# Patient Record
Sex: FEMALE | Race: WHITE | NOT HISPANIC OR LATINO
[De-identification: names, ages, dates, MRNs, and addresses within clinical notes are randomized per-mention and may not be internally consistent; named-entity substitution may affect disease eponyms.]

---

## 2017-01-24 ENCOUNTER — NON-APPOINTMENT (OUTPATIENT)
Age: 52
End: 2017-01-24

## 2017-01-24 ENCOUNTER — APPOINTMENT (OUTPATIENT)
Dept: CARDIOLOGY | Facility: CLINIC | Age: 52
End: 2017-01-24

## 2017-01-24 VITALS
BODY MASS INDEX: 18.66 KG/M2 | OXYGEN SATURATION: 99 % | HEIGHT: 65 IN | SYSTOLIC BLOOD PRESSURE: 137 MMHG | DIASTOLIC BLOOD PRESSURE: 82 MMHG | HEART RATE: 80 BPM | WEIGHT: 112 LBS

## 2017-01-24 VITALS — SYSTOLIC BLOOD PRESSURE: 110 MMHG | DIASTOLIC BLOOD PRESSURE: 60 MMHG

## 2017-01-24 DIAGNOSIS — K21.9 GASTRO-ESOPHAGEAL REFLUX DISEASE W/OUT ESOPHAGITIS: ICD-10-CM

## 2017-01-24 DIAGNOSIS — R07.89 OTHER CHEST PAIN: ICD-10-CM

## 2017-01-24 DIAGNOSIS — J45.909 UNSPECIFIED ASTHMA, UNCOMPLICATED: ICD-10-CM

## 2017-01-24 DIAGNOSIS — E55.9 VITAMIN D DEFICIENCY, UNSPECIFIED: ICD-10-CM

## 2017-01-25 ENCOUNTER — APPOINTMENT (OUTPATIENT)
Dept: COLORECTAL SURGERY | Facility: CLINIC | Age: 52
End: 2017-01-25

## 2017-01-25 ENCOUNTER — TRANSCRIPTION ENCOUNTER (OUTPATIENT)
Age: 52
End: 2017-01-25

## 2017-01-25 VITALS
HEART RATE: 77 BPM | RESPIRATION RATE: 14 BRPM | BODY MASS INDEX: 18.66 KG/M2 | DIASTOLIC BLOOD PRESSURE: 67 MMHG | SYSTOLIC BLOOD PRESSURE: 108 MMHG | HEIGHT: 65 IN | TEMPERATURE: 98.5 F | WEIGHT: 112 LBS

## 2017-01-25 DIAGNOSIS — K64.5 PERIANAL VENOUS THROMBOSIS: ICD-10-CM

## 2017-01-31 ENCOUNTER — APPOINTMENT (OUTPATIENT)
Dept: CARDIOLOGY | Facility: CLINIC | Age: 52
End: 2017-01-31

## 2017-02-10 ENCOUNTER — APPOINTMENT (OUTPATIENT)
Dept: CARDIOLOGY | Facility: CLINIC | Age: 52
End: 2017-02-10

## 2017-02-24 ENCOUNTER — APPOINTMENT (OUTPATIENT)
Dept: GASTROENTEROLOGY | Facility: CLINIC | Age: 52
End: 2017-02-24

## 2017-02-27 ENCOUNTER — RESULT REVIEW (OUTPATIENT)
Age: 52
End: 2017-02-27

## 2017-06-01 ENCOUNTER — APPOINTMENT (OUTPATIENT)
Dept: SURGICAL ONCOLOGY | Facility: CLINIC | Age: 52
End: 2017-06-01

## 2017-06-01 VITALS
HEIGHT: 65 IN | HEART RATE: 77 BPM | BODY MASS INDEX: 18.33 KG/M2 | DIASTOLIC BLOOD PRESSURE: 74 MMHG | WEIGHT: 110 LBS | SYSTOLIC BLOOD PRESSURE: 111 MMHG | RESPIRATION RATE: 15 BRPM

## 2017-06-01 DIAGNOSIS — N60.01 SOLITARY CYST OF RIGHT BREAST: ICD-10-CM

## 2017-06-01 RX ORDER — PANTOPRAZOLE 40 MG/1
40 TABLET, DELAYED RELEASE ORAL
Qty: 90 | Refills: 0 | Status: DISCONTINUED | COMMUNITY
Start: 2017-02-28 | End: 2017-06-01

## 2017-06-01 RX ORDER — ALBUTEROL 90 MCG
90 AEROSOL (GRAM) INHALATION
Refills: 0 | Status: DISCONTINUED | COMMUNITY
End: 2017-06-01

## 2017-11-13 ENCOUNTER — APPOINTMENT (OUTPATIENT)
Dept: SURGICAL ONCOLOGY | Facility: CLINIC | Age: 52
End: 2017-11-13
Payer: COMMERCIAL

## 2017-11-13 VITALS
OXYGEN SATURATION: 100 % | SYSTOLIC BLOOD PRESSURE: 126 MMHG | DIASTOLIC BLOOD PRESSURE: 74 MMHG | WEIGHT: 110 LBS | HEIGHT: 65 IN | BODY MASS INDEX: 18.33 KG/M2 | RESPIRATION RATE: 14 BRPM | HEART RATE: 81 BPM

## 2017-11-13 PROCEDURE — 99214 OFFICE O/P EST MOD 30 MIN: CPT

## 2018-05-21 ENCOUNTER — APPOINTMENT (OUTPATIENT)
Dept: SURGICAL ONCOLOGY | Facility: CLINIC | Age: 53
End: 2018-05-21

## 2018-05-23 ENCOUNTER — APPOINTMENT (OUTPATIENT)
Dept: SURGICAL ONCOLOGY | Facility: CLINIC | Age: 53
End: 2018-05-23
Payer: COMMERCIAL

## 2018-05-23 VITALS
WEIGHT: 108.58 LBS | HEART RATE: 82 BPM | SYSTOLIC BLOOD PRESSURE: 143 MMHG | TEMPERATURE: 98.2 F | BODY MASS INDEX: 18.09 KG/M2 | OXYGEN SATURATION: 100 % | DIASTOLIC BLOOD PRESSURE: 79 MMHG | HEIGHT: 65 IN

## 2018-05-23 DIAGNOSIS — Z00.00 ENCOUNTER FOR GENERAL ADULT MEDICAL EXAMINATION W/OUT ABNORMAL FINDINGS: ICD-10-CM

## 2018-05-23 PROCEDURE — 99214 OFFICE O/P EST MOD 30 MIN: CPT

## 2018-05-23 RX ORDER — RABEPRAZOLE SODIUM 20 MG/1
TABLET, DELAYED RELEASE ORAL
Refills: 0 | Status: DISCONTINUED | COMMUNITY
End: 2018-05-23

## 2018-05-23 RX ORDER — RABEPRAZOLE SODIUM 20 MG/1
20 TABLET, DELAYED RELEASE ORAL
Refills: 0 | Status: DISCONTINUED | COMMUNITY
End: 2018-05-23

## 2018-09-18 ENCOUNTER — APPOINTMENT (OUTPATIENT)
Dept: SURGICAL ONCOLOGY | Facility: CLINIC | Age: 53
End: 2018-09-18
Payer: COMMERCIAL

## 2018-09-18 VITALS
RESPIRATION RATE: 15 BRPM | SYSTOLIC BLOOD PRESSURE: 115 MMHG | HEART RATE: 88 BPM | DIASTOLIC BLOOD PRESSURE: 69 MMHG | WEIGHT: 108 LBS | HEIGHT: 65 IN | BODY MASS INDEX: 17.99 KG/M2

## 2018-09-18 PROCEDURE — 99214 OFFICE O/P EST MOD 30 MIN: CPT

## 2018-11-08 ENCOUNTER — APPOINTMENT (OUTPATIENT)
Dept: SURGICAL ONCOLOGY | Facility: CLINIC | Age: 53
End: 2018-11-08
Payer: COMMERCIAL

## 2018-11-08 VITALS
WEIGHT: 110 LBS | SYSTOLIC BLOOD PRESSURE: 123 MMHG | HEIGHT: 65 IN | OXYGEN SATURATION: 99 % | BODY MASS INDEX: 18.33 KG/M2 | DIASTOLIC BLOOD PRESSURE: 71 MMHG | TEMPERATURE: 98.5 F | HEART RATE: 83 BPM

## 2018-11-08 DIAGNOSIS — R92.1 MAMMOGRAPHIC CALCIFICATION FOUND ON DIAGNOSTIC IMAGING OF BREAST: ICD-10-CM

## 2018-11-08 DIAGNOSIS — N60.02 SOLITARY CYST OF LEFT BREAST: ICD-10-CM

## 2018-11-08 PROCEDURE — 99214 OFFICE O/P EST MOD 30 MIN: CPT

## 2018-11-08 PROCEDURE — 10022: CPT

## 2018-11-30 ENCOUNTER — TRANSCRIPTION ENCOUNTER (OUTPATIENT)
Age: 53
End: 2018-11-30

## 2019-01-15 ENCOUNTER — APPOINTMENT (OUTPATIENT)
Dept: SURGICAL ONCOLOGY | Facility: CLINIC | Age: 54
End: 2019-01-15
Payer: COMMERCIAL

## 2019-01-15 ENCOUNTER — APPOINTMENT (OUTPATIENT)
Dept: CARDIOLOGY | Facility: CLINIC | Age: 54
End: 2019-01-15
Payer: COMMERCIAL

## 2019-01-15 ENCOUNTER — NON-APPOINTMENT (OUTPATIENT)
Age: 54
End: 2019-01-15

## 2019-01-15 VITALS
HEART RATE: 85 BPM | BODY MASS INDEX: 18.33 KG/M2 | DIASTOLIC BLOOD PRESSURE: 81 MMHG | OXYGEN SATURATION: 99 % | SYSTOLIC BLOOD PRESSURE: 128 MMHG | HEIGHT: 65 IN | WEIGHT: 110 LBS

## 2019-01-15 VITALS
BODY MASS INDEX: 18.16 KG/M2 | RESPIRATION RATE: 14 BRPM | SYSTOLIC BLOOD PRESSURE: 113 MMHG | HEIGHT: 65 IN | WEIGHT: 109 LBS | DIASTOLIC BLOOD PRESSURE: 75 MMHG | HEART RATE: 98 BPM

## 2019-01-15 DIAGNOSIS — R00.0 TACHYCARDIA, UNSPECIFIED: ICD-10-CM

## 2019-01-15 PROCEDURE — 99215 OFFICE O/P EST HI 40 MIN: CPT

## 2019-01-15 PROCEDURE — 93000 ELECTROCARDIOGRAM COMPLETE: CPT

## 2019-01-15 PROCEDURE — 99214 OFFICE O/P EST MOD 30 MIN: CPT

## 2019-01-15 RX ORDER — MULTIVITAMIN
CAPSULE ORAL
Refills: 0 | Status: ACTIVE | COMMUNITY

## 2019-01-15 RX ORDER — MULTIVIT-MIN/FOLIC/VIT K/LYCOP 400-300MCG
50 MCG TABLET ORAL DAILY
Refills: 0 | Status: DISCONTINUED | COMMUNITY
End: 2019-01-15

## 2019-01-15 NOTE — CONSULT LETTER
[Dear  ___] : Dear  [unfilled], [Courtesy Letter:] : I had the pleasure of seeing your patient, [unfilled], in my office today. [Please see my note below.] : Please see my note below. [Consult Closing:] : Thank you very much for allowing me to participate in the care of this patient.  If you have any questions, please do not hesitate to contact me. [Sincerely,] : Sincerely, [FreeTextEntry3] : Mj Andrade MD, MPH, FACS\par Surgical Oncology\par Assistant Professor of Surgery\par [employer] at [facility] \par \par

## 2019-01-15 NOTE — ASSESSMENT
[FreeTextEntry1] : IMP:\par Fibrocystic change with a + family hx. of breast cancer in her mother\par BRCA Negative \par 10/17/18 - Diagnostic left mammogram - extremely dense breast tissue, probably benign microcalcifications are again identified in the upper outer posterior left breast with no significant changes, a circumscribed mass in the retroareolar region of the left breast is again noted, shown to be a large cyst. BIRADS 3  B/L Ultrasound performed on the same day showed innumerable cysts and fibrocystic nodules bilaterally, some complicated with internal debris. Of note, at the retroareolar position of the left breast, a large cyst is again noted measuring 4.1 x 2.1 x 3.8 cm. Increased in size and causing her discomfort. \par Fibrocystic; no dimpling noted on exam; no masses or nipple discharge \par \par Plan:\par 1) B/L mammogram/sonogram 4/2019 (script in computer)\par 2) B/L Breast MRI now\par 3) RTO after 4/2019 imaging\par Her lifetime risk of breast cancer was calculated over 20%  - we spoke about breast MRI and she will reconsider at a later date

## 2019-01-15 NOTE — HISTORY OF PRESENT ILLNESS
[de-identified] : Patient is a 53 year old female who presents to the office for a follow up visit. \par \par Past Hx: \par s/p benign RIGHT breast biopsy on 11/8/2012; s/p RIGHT benign breast biopsy on 5/17/16; + family history of Breast CA (mother- age 57/ paternal aunt- age 39-40); Patient is BRCA Negative.  \par \par Mammography done on 4/14/17 which showed benign appearing bilateral calcifications, no suspicious masses and/or calcifications, no evidence of malignancy; BIRADS 2. Ultrasound on 4/14/17 showed multiple cysts, some with debris, and benign-appearing circumscribed hypoechoic nodules bilaterally; largest cyst in LEFT breast at 9-10:00 measuring 5.7 cm; largest cyst in RIGHT breast at 1:00 measuring 1.9 cm; BIRADS 3. \par \par Bilateral breast sonogram performed on 10/17/17 revealed no significant change to prior ultrasound.  Multiple cysts, some with debris, and benign appearing circumscribed hypoechoic nodules are again identified bilaterally.  There is no significant change in either breast.  The largest cyst is currently in the left breast at the 6:00 through 9:00 regions, measuring 6.1 cm.  The largest cyst in the right breast 1:00 measuring 2.0 cm. No suspicious axillary lymphadenopathy.  BIRADS 3 findings. \par \par At this time patient denies breast pain, palpable masses, skin changes and/or nipple discharge.  Reports occasional breast discomfort which is cyclic in nature. LMP: 10/20/17\par \par Interval history:\par 4/2018 Mammo and US - BIRADS 3\par - multiple benign-appearing nodules are seen bilaterally.  No new suspicious solid finding.  A repeat bilateral breast US is advised in 6 months for re-evaluation.  Left dx mammo for probably benign microcalcifications are also advised.\par Internist: Dr. Joanne Angeles\par \par 9/2018 - c/o left breast pain at site of multiple cysts on last US - planning for bilateral US and left dx mammo in 11/2018\par \par 10/17/18 - Diagnostic left mammogram - extremely dense breast tissue, probably benign microcalcifications are again identified in the upper outer posterior left breast with no significant changes, a circumscribed mass in the retroareolar region of the left breast is again noted, shown to be a large cyst. BIRADS 3  B/L Ultrasound performed on the same day showed innumerable cysts and fibrocystic nodules bilaterally, some complicated with internal debris. Of note, at the retroareolar position of the left breast, a large cyst is again noted measuring 4.1 x 2.1 x 3.8 cm. Increased in size and causing her discomfort. \par \par 11/8/18 - Here for left retroareolar cyst aspiration. \par \par 1/15/19 - The patient is unsure whether she has right retroareolar skin dimpling in the 3-4:00 axis. States she is "lumpy" but denies any distinct masses or nipple discharge bilaterally. She is scheduled for breast imaging in 4/2019.

## 2019-01-15 NOTE — PHYSICAL EXAM
[Normal] : supple, no neck mass and thyroid not enlarged [Normal Neck Lymph Nodes] : normal neck lymph nodes  [Normal Supraclavicular Lymph Nodes] : normal supraclavicular lymph nodes [Normal Axillary Lymph Nodes] : normal axillary lymph nodes [Normal] : oriented to person, place and time, with appropriate affect [de-identified] : Diffuse bilateral fibrocystic changes with multiple bilateral breast cysts but no suspicious masses or nipple discharge  [FreeTextEntry1] : Deferred.

## 2019-01-15 NOTE — REASON FOR VISIT
[Follow-Up Visit] : a follow-up visit for [Breast Cyst] : breast cyst [Fibrocystic Breast] : fibrocystic breast [FreeTextEntry2] : JEVON RISK >20%

## 2019-01-17 ENCOUNTER — APPOINTMENT (OUTPATIENT)
Dept: CARDIOLOGY | Facility: CLINIC | Age: 54
End: 2019-01-17
Payer: COMMERCIAL

## 2019-01-17 PROCEDURE — 93268 ECG RECORD/REVIEW: CPT

## 2019-01-24 ENCOUNTER — APPOINTMENT (OUTPATIENT)
Dept: CARDIOLOGY | Facility: CLINIC | Age: 54
End: 2019-01-24
Payer: COMMERCIAL

## 2019-01-28 NOTE — PHYSICAL EXAM
[General Appearance - Well Developed] : well developed [General Appearance - In No Acute Distress] : no acute distress [Normal Conjunctiva] : the conjunctiva exhibited no abnormalities [No Oral Pallor] : no oral pallor [Respiration, Rhythm And Depth] : normal respiratory rhythm and effort [Exaggerated Use Of Accessory Muscles For Inspiration] : no accessory muscle use [Auscultation Breath Sounds / Voice Sounds] : lungs were clear to auscultation bilaterally [Bowel Sounds] : normal bowel sounds [Abdomen Tenderness] : non-tender [Abnormal Walk] : normal gait [Cyanosis, Localized] : no localized cyanosis [] : no rash [Oriented To Time, Place, And Person] : oriented to person, place, and time [Not Palpable] : not palpable [No Precordial Heave] : no precordial heave was noted [Normal Rate] : normal [Rhythm Regular] : regular [Normal S1] : normal S1 [Normal S2] : normal S2 [No Gallop] : no gallop heard [No Murmur] : no murmurs heard [2+] : left 2+ [No Abnormalities] : the abdominal aorta was not enlarged and no bruit was heard [No Pitting Edema] : no pitting edema present [FreeTextEntry1] : no JVD is appreciated at a 45° angle [Apical Thrill] : no thrill palpable at the apex [Click] : no click [Pericardial Rub] : no pericardial rub [Right Carotid Bruit] : no bruit heard over the right carotid [Left Carotid Bruit] : no bruit heard over the left carotid

## 2019-01-28 NOTE — HISTORY OF PRESENT ILLNESS
[FreeTextEntry1] : Mrs. Michelle Mccann presented to the office today for follow-up cardiac evaluation. I previously evaluated the patient on 17 for an initial cardiology consultation, noting that she had been referred to our office by a friend, who is also a patient under my care.\par \par The patient is a 53-year-old female who has a reported history of mild mitral regurgitation, ventricular premature contractions, atypical chest discomfort, GERD, extrinsic asthma, fibrocystic breast disease, external hemorrhoids, and vitamin D  deficiency.\par \par The patient obtained an Apple watch in 2018, which monitors her pulse rate. The patient became concerned when the watch indicated that at times, her heart rate was running in in the range of 160-184 while she was at rest, without associated symptoms.  At other times, the heart rate was reported as running as low as 41 beats per minute at rest, also without associated symptoms. She has not noted these heart rate recordings at the time they had occurred, however. Her average heart rate has been reported as running in the range of 53-55 beats per minute on the watch.\par \par The patient does not describe having experienced any symptoms of chest discomfort which have been precipitated or exacerbated by physical activity. She has not noted dyspnea on exertion in association with her activities. She has not noted orthopnea, paroxysmal nocturnal dyspnea, or lower extremity edema. She has not experienced any recent palpitations. She has not experienced any episodes of presyncope or syncope.\par \par Exercise stress testing performed on 2/10/17 revealed the patient to exhibit normal exercise tolerance, without the inducement of cardiac symptoms, electrocardiographic evidence of myocardial ischemia, or significant arrhythmias.  A normal heart rate and blood pressure response to exercise were exhibited.\par \par Echocardiography performed on 17 revealed normal cardiac chamber sizes with normal left ventricular wall thickness and wall motion. Left ventricular systolic function was normal, with an estimated ejection fraction of 60%. Left ventricular diastolic function was normal. The mitral valve was noted to be thickened, without evidence of systolic prolapse. Mild mitral regurgitation was demonstrated. The aortic valve was normal in appearance with a normal systolic opening. The tricuspid valve appeared normal, with mild tricuspid regurgitation demonstrated. The pulmonary artery systolic pressure was estimated to be normal.\par \par As far as risk factors for coronary artery disease are concerned, the patient denies having a history of hypertension, diabetes, or a dyslipidemia. She denies a history of cigarette smoking. She does not have a known immediate family history of premature coronary artery disease.\par \par Past medical/surgical history according to the patient is significant for extrinsic asthma, typically allergy-induced (she has an albuterol rescue inhaler, however, she has not recently required usage of this inhaler). She has a history of GERD, including having undergone upper endoscopy procedures in the past. She has a history of fibrocystic breast disease, having undergone multiple fine needle biopsy procedure is, all of which have been benign (most recently performed on a right breast cyst on 16). She has a history of external hemorrhoids. She has been detected as having vitamin D deficiency. She has a history of 3 pregnancies, one of which was terminated via a therapeutic , and the other 2 of which were full-term and uncomplicated, delivered vaginally.\par \par The the patient does not report having any known medication allergies, however she does report having exhibited an allergy to carmine (red dye).

## 2019-01-28 NOTE — DISCUSSION/SUMMARY
[FreeTextEntry1] : Mrs. Mccann has a reported history of exhibiting ventricular premature contractions in the past, however, she has not recently experienced palpitations. She has a history of having experienced various atypical chest discomforts, as described, however, she does not describe having experienced any symptoms of chest discomfort which would be considered typical for an anginal syndrome. She has not exhibited any signs or symptoms to suggest the presence of an anginal syndrome, congestive heart failure, or a hemodynamically-compromising arrhythmia. Her cardiac examination today is unremarkable. Her blood pressure reading today is normal. Her electrocardiogram today reveals sinus rhythm with mild right axis deviation and non-specific ST-T wave abnormalities. Comparison with her previous office tracing performed on 1/24/17 reveals non-specific ST-T wave changes.\par \par The patient has been concerned regarding readings on her Apple watch of pulse rates in the tachycardic range (160-184 beats per minute), as well as the bradycardic range (as low as 41 beats per minute). She has been asymptomatic of these reported episodes of tachycardia and/or bradycardia, noting that she often notes these readings indicated on her watch after they have already reportedly occurred.\par \par For further evaluation of the abnormal heart rate readings on the patient's watch device, I am referring her for cardiac rhythm and event monitoring with auto-triggered capability, in order to determine if she does have abnormal episodes of bradycardia or tachycardia. In addition, I am referring her for follow-up echocardiography to reassess cardiac structural integrity, left ventricular function, and valvular morphology and function. The patient is going to make arrangements to have these studies performed through our office, and I will telephone her to discuss the findings, once the studies have been completed.\par \par I have encouraged the patient to continue exercising regularly. The importance of proper dietary habits and proper weight maintenance was discussed as well.\par \par I asked the patient to call me if she should have any questions or problems pertaining to these matters, and especially if she should experience any concerning signs or symptoms. Further recommendations regarding cardiac evaluation and/or treatment will be considered, pending the patient's clinical course, and the findings on the aforementioned cardiac studies.

## 2019-01-31 ENCOUNTER — APPOINTMENT (OUTPATIENT)
Dept: CARDIOLOGY | Facility: CLINIC | Age: 54
End: 2019-01-31
Payer: COMMERCIAL

## 2019-01-31 PROCEDURE — 93306 TTE W/DOPPLER COMPLETE: CPT

## 2019-03-13 ENCOUNTER — APPOINTMENT (OUTPATIENT)
Dept: PULMONOLOGY | Facility: CLINIC | Age: 54
End: 2019-03-13

## 2019-06-07 ENCOUNTER — MOBILE ON CALL (OUTPATIENT)
Age: 54
End: 2019-06-07

## 2019-07-02 ENCOUNTER — APPOINTMENT (OUTPATIENT)
Dept: SURGICAL ONCOLOGY | Facility: CLINIC | Age: 54
End: 2019-07-02
Payer: COMMERCIAL

## 2019-07-02 VITALS
SYSTOLIC BLOOD PRESSURE: 111 MMHG | OXYGEN SATURATION: 100 % | DIASTOLIC BLOOD PRESSURE: 73 MMHG | HEIGHT: 64 IN | HEART RATE: 75 BPM | TEMPERATURE: 98.1 F | WEIGHT: 108 LBS | BODY MASS INDEX: 18.44 KG/M2 | RESPIRATION RATE: 17 BRPM

## 2019-07-02 PROCEDURE — 99214 OFFICE O/P EST MOD 30 MIN: CPT

## 2019-07-02 NOTE — PHYSICAL EXAM
[Normal] : supple, no neck mass and thyroid not enlarged [Normal Neck Lymph Nodes] : normal neck lymph nodes  [Normal Supraclavicular Lymph Nodes] : normal supraclavicular lymph nodes [Normal Axillary Lymph Nodes] : normal axillary lymph nodes [Normal] : oriented to person, place and time, with appropriate affect [FreeTextEntry1] : Deferred. [de-identified] : Diffuse bilateral fibrocystic changes  but no suspicious masses or nipple discharge

## 2019-07-02 NOTE — ASSESSMENT
[FreeTextEntry1] : IMP:\par Fibrocystic change with a + family hx. of breast cancer in her mother\par BRCA Negative \par 5/16/19 - MAMMOGRAM/SONOGRAM - Left 9:00 periareolar axis, a 1.4 x 1.0 x 1.3 cm complex cyst is identified, correlating with mammogram.  This may be the residual of the previously noted 4.1 cm cyst in the periareolar region.  As there is either internal debris or a solid component, US guided FNA is advised.  No suspicious axillary lymph nodes. BIRADS 4a. \par \par She is s/p left breast 9:00 FNA for cytology which showed an acellular specimen, blood and rare ductal cells. \par \par Fibrocystic; no masses or nipple discharge on exam\par \par Plan:\par 1) Left breast ultrasound 11/2019\par 2) Breast MRI in 1 year for high-risk screening\par 3) RTO after imaging \par Her lifetime risk of breast cancer was calculated over 20%

## 2019-07-02 NOTE — CONSULT LETTER
[Dear  ___] : Dear  [unfilled], [Courtesy Letter:] : I had the pleasure of seeing your patient, [unfilled], in my office today. [Sincerely,] : Sincerely, [Consult Closing:] : Thank you very much for allowing me to participate in the care of this patient.  If you have any questions, please do not hesitate to contact me. [Please see my note below.] : Please see my note below. [FreeTextEntry3] : Mj Andrade MD, MPH, FACS\par Surgical Oncology\par Assistant Professor of Surgery\par [facility] [employer] at [facility] \par \par

## 2019-07-02 NOTE — HISTORY OF PRESENT ILLNESS
[de-identified] : Patient is a 53 year old female who presents to the office for a 6 month follow up visit. \par \par Past Hx: \par s/p benign RIGHT breast biopsy on 11/8/2012; s/p RIGHT benign breast biopsy on 5/17/16; + family history of Breast CA (mother- age 57/ paternal aunt- age 39-40); Patient is BRCA Negative.  \par \par Mammography done on 4/14/17 which showed benign appearing bilateral calcifications, no suspicious masses and/or calcifications, no evidence of malignancy; BIRADS 2. Ultrasound on 4/14/17 showed multiple cysts, some with debris, and benign-appearing circumscribed hypoechoic nodules bilaterally; largest cyst in LEFT breast at 9-10:00 measuring 5.7 cm; largest cyst in RIGHT breast at 1:00 measuring 1.9 cm; BIRADS 3. \par \par Bilateral breast sonogram performed on 10/17/17 revealed no significant change to prior ultrasound.  Multiple cysts, some with debris, and benign appearing circumscribed hypoechoic nodules are again identified bilaterally.  There is no significant change in either breast.  The largest cyst is currently in the left breast at the 6:00 through 9:00 regions, measuring 6.1 cm.  The largest cyst in the right breast 1:00 measuring 2.0 cm. No suspicious axillary lymphadenopathy.  BIRADS 3 findings. \par \par At this time patient denies breast pain, palpable masses, skin changes and/or nipple discharge.  Reports occasional breast discomfort which is cyclic in nature. LMP: 10/20/17\par \par Interval history:\par 4/2018 Mammo and US - BIRADS 3\par - multiple benign-appearing nodules are seen bilaterally.  No new suspicious solid finding.  A repeat bilateral breast US is advised in 6 months for re-evaluation.  Left dx mammo for probably benign microcalcifications are also advised.\par Internist: Dr. Joanne Angeles\par \par 9/2018 - c/o left breast pain at site of multiple cysts on last US - planning for bilateral US and left dx mammo in 11/2018\par \par 10/17/18 - Diagnostic left mammogram - extremely dense breast tissue, probably benign microcalcifications are again identified in the upper outer posterior left breast with no significant changes, a circumscribed mass in the retroareolar region of the left breast is again noted, shown to be a large cyst. BIRADS 3  B/L Ultrasound performed on the same day showed innumerable cysts and fibrocystic nodules bilaterally, some complicated with internal debris. Of note, at the retroareolar position of the left breast, a large cyst is again noted measuring 4.1 x 2.1 x 3.8 cm. Increased in size and causing her discomfort. \par \par 11/8/18 - Here for left retroareolar cyst aspiration. \par \par 1/15/19 - The patient is unsure whether she has right retroareolar skin dimpling in the 3-4:00 axis. States she is "lumpy" but denies any distinct masses or nipple discharge bilaterally. She is scheduled for breast imaging in 4/2019. \par \par 5/16/19 - MAMMOGRAM/SONOGRAM - Left 9:00 periareolar axis, a 1.4 x 1.0 x 1.3 cm complex cyst is identified, correlating with mammogram.  This may be the residual of the previously noted 4.1 cm cyst in the periareolar region.  As there is either internal debris or a solid component, US guided FNA is advised.  No suspicious axillary lymph nodes. BIRADS 4a. \par \par She is s/p left breast 9:00 FNA for cytology which showed an acellular specimen, blood and rare ductal cells. \par \par 7/2/19 - Has c/o occasional bilateral breast tenderness but denies palpable breast masses or nipple discharge.  LMP: 3/31/19

## 2019-07-02 NOTE — REASON FOR VISIT
[Breast Cyst] : breast cyst [Follow-Up Visit] : a follow-up visit for [Fibrocystic Breast] : fibrocystic breast [FreeTextEntry2] : JEVON RISK >20%

## 2020-02-03 ENCOUNTER — APPOINTMENT (OUTPATIENT)
Dept: CARDIOLOGY | Facility: CLINIC | Age: 55
End: 2020-02-03
Payer: COMMERCIAL

## 2020-02-03 ENCOUNTER — NON-APPOINTMENT (OUTPATIENT)
Age: 55
End: 2020-02-03

## 2020-02-03 VITALS
HEIGHT: 64 IN | WEIGHT: 109 LBS | HEART RATE: 75 BPM | SYSTOLIC BLOOD PRESSURE: 133 MMHG | BODY MASS INDEX: 18.61 KG/M2 | DIASTOLIC BLOOD PRESSURE: 79 MMHG | OXYGEN SATURATION: 100 %

## 2020-02-03 DIAGNOSIS — I49.1 ATRIAL PREMATURE DEPOLARIZATION: ICD-10-CM

## 2020-02-03 DIAGNOSIS — R00.2 PALPITATIONS: ICD-10-CM

## 2020-02-03 DIAGNOSIS — I49.3 VENTRICULAR PREMATURE DEPOLARIZATION: ICD-10-CM

## 2020-02-03 DIAGNOSIS — I34.0 NONRHEUMATIC MITRAL (VALVE) INSUFFICIENCY: ICD-10-CM

## 2020-02-03 PROCEDURE — 93000 ELECTROCARDIOGRAM COMPLETE: CPT

## 2020-02-03 PROCEDURE — 99215 OFFICE O/P EST HI 40 MIN: CPT

## 2020-02-03 RX ORDER — OMEPRAZOLE 20 MG/1
20 CAPSULE, DELAYED RELEASE ORAL
Refills: 3 | Status: ACTIVE | COMMUNITY

## 2020-02-03 RX ORDER — MONTELUKAST SODIUM 10 MG/1
10 TABLET, FILM COATED ORAL DAILY
Qty: 30 | Refills: 0 | Status: ACTIVE | COMMUNITY

## 2020-02-03 RX ORDER — RABEPRAZOLE SODIUM 20 MG/1
20 TABLET, DELAYED RELEASE ORAL DAILY
Qty: 30 | Refills: 3 | Status: DISCONTINUED | COMMUNITY
End: 2020-02-03

## 2020-02-14 ENCOUNTER — TRANSCRIPTION ENCOUNTER (OUTPATIENT)
Age: 55
End: 2020-02-14

## 2020-02-14 ENCOUNTER — APPOINTMENT (OUTPATIENT)
Dept: CARDIOLOGY | Facility: CLINIC | Age: 55
End: 2020-02-14
Payer: COMMERCIAL

## 2020-02-14 PROCEDURE — 93306 TTE W/DOPPLER COMPLETE: CPT

## 2020-07-10 ENCOUNTER — RESULT REVIEW (OUTPATIENT)
Age: 55
End: 2020-07-10

## 2020-07-10 ENCOUNTER — APPOINTMENT (OUTPATIENT)
Dept: MRI IMAGING | Facility: CLINIC | Age: 55
End: 2020-07-10
Payer: COMMERCIAL

## 2020-07-10 ENCOUNTER — OUTPATIENT (OUTPATIENT)
Dept: OUTPATIENT SERVICES | Facility: HOSPITAL | Age: 55
LOS: 1 days | End: 2020-07-10
Payer: COMMERCIAL

## 2020-07-10 DIAGNOSIS — Z12.39 ENCOUNTER FOR OTHER SCREENING FOR MALIGNANT NEOPLASM OF BREAST: ICD-10-CM

## 2020-07-10 PROCEDURE — C8937: CPT

## 2020-07-10 PROCEDURE — 77049 MRI BREAST C-+ W/CAD BI: CPT | Mod: 26

## 2020-07-10 PROCEDURE — A9585: CPT

## 2020-07-10 PROCEDURE — C8908: CPT

## 2020-07-14 PROBLEM — N60.01 BILATERAL BREAST CYSTS: Status: ACTIVE | Noted: 2017-11-13

## 2020-07-16 ENCOUNTER — APPOINTMENT (OUTPATIENT)
Dept: SURGICAL ONCOLOGY | Facility: CLINIC | Age: 55
End: 2020-07-16
Payer: COMMERCIAL

## 2020-07-16 VITALS
TEMPERATURE: 98 F | SYSTOLIC BLOOD PRESSURE: 146 MMHG | RESPIRATION RATE: 14 BRPM | DIASTOLIC BLOOD PRESSURE: 76 MMHG | HEIGHT: 64 IN | WEIGHT: 110 LBS | BODY MASS INDEX: 18.78 KG/M2

## 2020-07-16 DIAGNOSIS — N60.01 SOLITARY CYST OF RIGHT BREAST: ICD-10-CM

## 2020-07-16 DIAGNOSIS — N60.02 SOLITARY CYST OF RIGHT BREAST: ICD-10-CM

## 2020-07-16 PROCEDURE — 99214 OFFICE O/P EST MOD 30 MIN: CPT

## 2020-10-12 NOTE — PHYSICAL EXAM
[Normal] : supple, no neck mass and thyroid not enlarged [Normal Neck Lymph Nodes] : normal neck lymph nodes  [Normal Supraclavicular Lymph Nodes] : normal supraclavicular lymph nodes [Normal Groin Lymph Nodes] : normal groin lymph nodes [Normal Axillary Lymph Nodes] : normal axillary lymph nodes [Normal] : oriented to person, place and time, with appropriate affect [de-identified] : normal fibrocystic breast exam

## 2020-10-12 NOTE — REASON FOR VISIT
[Follow-Up Visit] : a follow-up visit for [Breast Cyst] : breast cyst [Fibrocystic Breast] : fibrocystic breast [FreeTextEntry2] : breast cyst, fibrocystic breast and JEVON RISK >20%.

## 2020-10-12 NOTE — HISTORY OF PRESENT ILLNESS
[de-identified] : Patient is a 54 year old female who presents to the office for a 12 month follow up visit. \par \par Past Hx: \par s/p benign RIGHT breast biopsy on 11/8/2012; s/p RIGHT benign breast biopsy on 5/17/16; + family history of Breast CA (mother- age 57/ paternal aunt- age 39-40); Patient is BRCA Negative.  \par \par Mammography done on 4/14/17 which showed benign appearing bilateral calcifications, no suspicious masses and/or calcifications, no evidence of malignancy; BIRADS 2. Ultrasound on 4/14/17 showed multiple cysts, some with debris, and benign-appearing circumscribed hypoechoic nodules bilaterally; largest cyst in LEFT breast at 9-10:00 measuring 5.7 cm; largest cyst in RIGHT breast at 1:00 measuring 1.9 cm; BIRADS 3. \par \par Bilateral breast sonogram performed on 10/17/17 revealed no significant change to prior ultrasound.  Multiple cysts, some with debris, and benign appearing circumscribed hypoechoic nodules are again identified bilaterally.  There is no significant change in either breast.  The largest cyst is currently in the left breast at the 6:00 through 9:00 regions, measuring 6.1 cm.  The largest cyst in the right breast 1:00 measuring 2.0 cm. No suspicious axillary lymphadenopathy.  BIRADS 3 findings. \par \par At this time patient denies breast pain, palpable masses, skin changes and/or nipple discharge.  Reports occasional breast discomfort which is cyclic in nature. LMP: 10/20/17\par \par Interval history:\par 4/2018 Mammo and US - BIRADS 3\par - multiple benign-appearing nodules are seen bilaterally.  No new suspicious solid finding.  A repeat bilateral breast US is advised in 6 months for re-evaluation.  Left dx mammo for probably benign microcalcifications are also advised.\par Internist: Dr. Joanne Angeles\par \par 9/2018 - c/o left breast pain at site of multiple cysts on last US - planning for bilateral US and left dx mammo in 11/2018\par \par 10/17/18 - Diagnostic left mammogram - extremely dense breast tissue, probably benign microcalcifications are again identified in the upper outer posterior left breast with no significant changes, a circumscribed mass in the retroareolar region of the left breast is again noted, shown to be a large cyst. BIRADS 3  B/L Ultrasound performed on the same day showed innumerable cysts and fibrocystic nodules bilaterally, some complicated with internal debris. Of note, at the retroareolar position of the left breast, a large cyst is again noted measuring 4.1 x 2.1 x 3.8 cm. Increased in size and causing her discomfort. \par \par 11/8/18 - Here for left retroareolar cyst aspiration. \par \par 1/15/19 - The patient is unsure whether she has right retroareolar skin dimpling in the 3-4:00 axis. States she is "lumpy" but denies any distinct masses or nipple discharge bilaterally. She is scheduled for breast imaging in 4/2019. \par \par 5/16/19 - MAMMOGRAM/SONOGRAM - Left 9:00 periareolar axis, a 1.4 x 1.0 x 1.3 cm complex cyst is identified, correlating with mammogram.  This may be the residual of the previously noted 4.1 cm cyst in the periareolar region.  As there is either internal debris or a solid component, US guided FNA is advised.  No suspicious axillary lymph nodes. BIRADS 4a. \par \par She is s/p left breast 9:00 FNA for cytology which showed an acellular specimen, blood and rare ductal cells. \par \par 7/2/19 - Has c/o occasional bilateral breast tenderness but denies palpable breast masses or nipple discharge.  LMP: 3/31/19 \par \par 7/16/2020- In Nov 2019 underwent a Left MMG- no longer noted prior nodule/cyst (Birads 3).  Same day bilateral breast sono notable for innumerable cysts and fibrocystic nodules b/l.  Complicated 7mm complicated cyst left breast 9:00 periareolar position.\par 7/16/2020- Had an MRI of breast on 7/10/2020 which was without evidence of malignancy (BI-RADS 2).  Had an MRI of breast on 7/10/2020 which was without evidence of malignancy (BI-RADS 2).  \par

## 2020-10-12 NOTE — ASSESSMENT
[FreeTextEntry1] : IMP:\par Fibrocystic change with a + family hx. of breast cancer in her mother\par BRCA Negative \par She is s/p left breast 9:00 FNA May 2019for cytology which showed an acellular specimen, blood and rare ductal cells. \par Nov 2019 underwent a Left MMG- no longer noted prior nodule/cyst (Birads 3).  Same day bilateral breast sono notable for innumerable cysts and fibrocystic nodules b/l.  Complicated 7mm complicated cyst left breast 9:00 periareolar position.\par Had an MRI of breast on 7/10/2020 which was without evidence of malignancy (BI-RADS 2).  \par \par 7/16/2020: Doing well, no issues.\par \par Plan:\par 1) B/L mammo/sono Nov 2020\par 2) MRI breast July 2021.  Her lifetime risk of breast cancer was calculated over 20%.

## 2021-01-27 ENCOUNTER — RESULT REVIEW (OUTPATIENT)
Age: 56
End: 2021-01-27

## 2021-01-27 ENCOUNTER — APPOINTMENT (OUTPATIENT)
Dept: MAMMOGRAPHY | Facility: CLINIC | Age: 56
End: 2021-01-27
Payer: COMMERCIAL

## 2021-01-27 ENCOUNTER — APPOINTMENT (OUTPATIENT)
Dept: ULTRASOUND IMAGING | Facility: CLINIC | Age: 56
End: 2021-01-27
Payer: COMMERCIAL

## 2021-01-27 ENCOUNTER — OUTPATIENT (OUTPATIENT)
Dept: OUTPATIENT SERVICES | Facility: HOSPITAL | Age: 56
LOS: 1 days | End: 2021-01-27
Payer: COMMERCIAL

## 2021-01-27 DIAGNOSIS — Z12.39 ENCOUNTER FOR OTHER SCREENING FOR MALIGNANT NEOPLASM OF BREAST: ICD-10-CM

## 2021-01-27 DIAGNOSIS — Z00.8 ENCOUNTER FOR OTHER GENERAL EXAMINATION: ICD-10-CM

## 2021-01-27 PROCEDURE — 77067 SCR MAMMO BI INCL CAD: CPT | Mod: 26

## 2021-01-27 PROCEDURE — 77063 BREAST TOMOSYNTHESIS BI: CPT | Mod: 26

## 2021-01-27 PROCEDURE — 76641 ULTRASOUND BREAST COMPLETE: CPT

## 2021-01-27 PROCEDURE — 76641 ULTRASOUND BREAST COMPLETE: CPT | Mod: 26,50

## 2021-01-27 PROCEDURE — 77063 BREAST TOMOSYNTHESIS BI: CPT

## 2021-01-27 PROCEDURE — 77067 SCR MAMMO BI INCL CAD: CPT

## 2021-02-19 ENCOUNTER — TRANSCRIPTION ENCOUNTER (OUTPATIENT)
Age: 56
End: 2021-02-19

## 2021-04-06 ENCOUNTER — TRANSCRIPTION ENCOUNTER (OUTPATIENT)
Age: 56
End: 2021-04-06

## 2021-08-17 ENCOUNTER — OUTPATIENT (OUTPATIENT)
Dept: OUTPATIENT SERVICES | Facility: HOSPITAL | Age: 56
LOS: 1 days | End: 2021-08-17
Payer: COMMERCIAL

## 2021-08-17 ENCOUNTER — APPOINTMENT (OUTPATIENT)
Dept: MRI IMAGING | Facility: CLINIC | Age: 56
End: 2021-08-17
Payer: COMMERCIAL

## 2021-08-17 DIAGNOSIS — Z00.8 ENCOUNTER FOR OTHER GENERAL EXAMINATION: ICD-10-CM

## 2021-08-17 DIAGNOSIS — Z12.39 ENCOUNTER FOR OTHER SCREENING FOR MALIGNANT NEOPLASM OF BREAST: ICD-10-CM

## 2021-08-17 PROCEDURE — A9585: CPT

## 2021-08-17 PROCEDURE — 77049 MRI BREAST C-+ W/CAD BI: CPT | Mod: 26

## 2021-08-17 PROCEDURE — C8908: CPT

## 2021-08-17 PROCEDURE — C8937: CPT

## 2021-08-21 DIAGNOSIS — N63.20 UNSPECIFIED LUMP IN THE LEFT BREAST, UNSPECIFIED QUADRANT: ICD-10-CM

## 2021-08-23 ENCOUNTER — OUTPATIENT (OUTPATIENT)
Dept: OUTPATIENT SERVICES | Facility: HOSPITAL | Age: 56
LOS: 1 days | End: 2021-08-23
Payer: COMMERCIAL

## 2021-08-23 ENCOUNTER — RESULT REVIEW (OUTPATIENT)
Age: 56
End: 2021-08-23

## 2021-08-23 ENCOUNTER — APPOINTMENT (OUTPATIENT)
Dept: ULTRASOUND IMAGING | Facility: CLINIC | Age: 56
End: 2021-08-23

## 2021-08-23 DIAGNOSIS — N63.20 UNSPECIFIED LUMP IN THE LEFT BREAST, UNSPECIFIED QUADRANT: ICD-10-CM

## 2021-08-23 PROCEDURE — 76642 ULTRASOUND BREAST LIMITED: CPT | Mod: 26,LT

## 2021-08-23 PROCEDURE — 76642 ULTRASOUND BREAST LIMITED: CPT

## 2021-08-24 PROBLEM — R92.8 ABNORMAL MRI, BREAST: Status: ACTIVE | Noted: 2021-08-24

## 2021-08-24 NOTE — HISTORY OF PRESENT ILLNESS
[de-identified] : Patient is a 56 year old female who presents to the office for a 12 month follow up visit. \par \par Past Hx: \par s/p benign RIGHT breast biopsy on 11/8/2012; s/p RIGHT benign breast biopsy on 5/17/16; + family history of Breast CA (mother- age 57/ paternal aunt- age 39-40); Patient is BRCA Negative.  \par \par Mammography done on 4/14/17 which showed benign appearing bilateral calcifications, no suspicious masses and/or calcifications, no evidence of malignancy; BIRADS 2. Ultrasound on 4/14/17 showed multiple cysts, some with debris, and benign-appearing circumscribed hypoechoic nodules bilaterally; largest cyst in LEFT breast at 9-10:00 measuring 5.7 cm; largest cyst in RIGHT breast at 1:00 measuring 1.9 cm; BIRADS 3. \par \par Bilateral breast sonogram performed on 10/17/17 revealed no significant change to prior ultrasound.  Multiple cysts, some with debris, and benign appearing circumscribed hypoechoic nodules are again identified bilaterally.  There is no significant change in either breast.  The largest cyst is currently in the left breast at the 6:00 through 9:00 regions, measuring 6.1 cm.  The largest cyst in the right breast 1:00 measuring 2.0 cm. No suspicious axillary lymphadenopathy.  BIRADS 3 findings. \par \par At this time patient denies breast pain, palpable masses, skin changes and/or nipple discharge.  Reports occasional breast discomfort which is cyclic in nature. LMP: 10/20/17\par \par Interval history:\par 4/2018 Mammo and US - BIRADS 3\par - multiple benign-appearing nodules are seen bilaterally.  No new suspicious solid finding.  A repeat bilateral breast US is advised in 6 months for re-evaluation.  Left dx mammo for probably benign microcalcifications are also advised.\par Internist: Dr. Joanne Angeles\par \par 9/2018 - c/o left breast pain at site of multiple cysts on last US - planning for bilateral US and left dx mammo in 11/2018\par \par 10/17/18 - Diagnostic left mammogram - extremely dense breast tissue, probably benign microcalcifications are again identified in the upper outer posterior left breast with no significant changes, a circumscribed mass in the retroareolar region of the left breast is again noted, shown to be a large cyst. BIRADS 3  B/L Ultrasound performed on the same day showed innumerable cysts and fibrocystic nodules bilaterally, some complicated with internal debris. Of note, at the retroareolar position of the left breast, a large cyst is again noted measuring 4.1 x 2.1 x 3.8 cm. Increased in size and causing her discomfort. \par \par 11/8/18 - Here for left retroareolar cyst aspiration. \par \par 1/15/19 - The patient is unsure whether she has right retroareolar skin dimpling in the 3-4:00 axis. States she is "lumpy" but denies any distinct masses or nipple discharge bilaterally. She is scheduled for breast imaging in 4/2019. \par \par 5/16/19 - MAMMOGRAM/SONOGRAM - Left 9:00 periareolar axis, a 1.4 x 1.0 x 1.3 cm complex cyst is identified, correlating with mammogram.  This may be the residual of the previously noted 4.1 cm cyst in the periareolar region.  As there is either internal debris or a solid component, US guided FNA is advised.  No suspicious axillary lymph nodes. BIRADS 4a. \par \par She is s/p left breast 9:00 FNA for cytology which showed an acellular specimen, blood and rare ductal cells. \par \par 7/2/19 - Has c/o occasional bilateral breast tenderness but denies palpable breast masses or nipple discharge.  LMP: 3/31/19 \par \par 7/16/2020- In Nov 2019 underwent a Left MMG- no longer noted prior nodule/cyst (Birads 3).  Same day bilateral breast sono notable for innumerable cysts and fibrocystic nodules b/l.  Complicated 7mm complicated cyst left breast 9:00 periareolar position.\par 7/16/2020- Had an MRI of breast on 7/10/2020 which was without evidence of malignancy (BI-RADS 2).  Had an MRI of breast on 7/10/2020 which was without evidence of malignancy (BI-RADS 2).  \par \par 8/26/2021- B/L mammogram/sonogram performed on 1/27/2021 showed stable bilateral nodules, no evidence of malignancy.  BIRADS 2.  B/L Breast MRI 8/17/2021 showed an 8 x 2 mm ovoid mass of persistent enhancement in the upper outer posterior left breast for which targeted left breast ultrasound is recommended.  If ultrasound is noncontributory, consider MRI guided biopsy. BIRADS 0.   Targeted left breast ultrasound performed on 8/23/2021 with benign findings.  No sonographic correlate to MRI finding.  Therefore, MRI guided biopsy would be required. BIRADS 4A.   Pt. denies palpable breast masses, nipple discharge, skin dimpling, inversion or breast pain.

## 2021-08-24 NOTE — REASON FOR VISIT
[Follow-Up Visit] : a follow-up visit for [Breast Cyst] : breast cyst [Fibrocystic Breast] : fibrocystic breast [FreeTextEntry2] : breast cyst, fibrocystic breast and JEVON RISK >20%.; Abnormal breast MRI

## 2021-08-24 NOTE — ASSESSMENT
[FreeTextEntry1] : IMP:\par Fibrocystic change with a + family hx. of breast cancer in her mother\par BRCA Negative \par She is s/p left breast 9:00 FNA May 2019for cytology which showed an acellular specimen, blood and rare ductal cells. \par Nov 2019 underwent a Left MMG- no longer noted prior nodule/cyst (Birads 3).  Same day bilateral breast sono notable for innumerable cysts and fibrocystic nodules b/l.  Complicated 7mm complicated cyst left breast 9:00 periareolar position. Had an MRI of breast on 7/10/2020 which was without evidence of malignancy (BI-RADS 2). \par \par 1) B/L mammogram/sonogram performed on 1/27/2021 showed stable bilateral nodules, no evidence of malignancy.  BIRADS 2.  \par 2) B/L Breast MRI 8/17/2021 showed an 8 x 2 mm ovoid mass of persistent enhancement in the upper outer posterior left breast for which targeted left breast ultrasound is recommended.  If ultrasound is noncontributory, consider MRI guided biopsy. BIRADS 0.   \par 3) Targeted left breast ultrasound performed on 8/23/2021 with benign findings.  No sonographic correlate to MRI finding.  Therefore, MRI guided biopsy would be required. BIRADS 4A.  \par No suspicious findings on exam \par \par \par Plan:\par 1) Left breast MRI guided biopsy\par 2)

## 2021-08-26 ENCOUNTER — APPOINTMENT (OUTPATIENT)
Dept: SURGICAL ONCOLOGY | Facility: CLINIC | Age: 56
End: 2021-08-26

## 2021-08-26 DIAGNOSIS — R92.8 OTHER ABNORMAL AND INCONCLUSIVE FINDINGS ON DIAGNOSTIC IMAGING OF BREAST: ICD-10-CM

## 2021-08-31 ENCOUNTER — RESULT REVIEW (OUTPATIENT)
Age: 56
End: 2021-08-31

## 2021-08-31 ENCOUNTER — APPOINTMENT (OUTPATIENT)
Dept: MRI IMAGING | Facility: CLINIC | Age: 56
End: 2021-08-31
Payer: COMMERCIAL

## 2021-08-31 ENCOUNTER — OUTPATIENT (OUTPATIENT)
Dept: OUTPATIENT SERVICES | Facility: HOSPITAL | Age: 56
LOS: 1 days | End: 2021-08-31
Payer: COMMERCIAL

## 2021-08-31 DIAGNOSIS — R92.8 OTHER ABNORMAL AND INCONCLUSIVE FINDINGS ON DIAGNOSTIC IMAGING OF BREAST: ICD-10-CM

## 2021-08-31 DIAGNOSIS — Z00.8 ENCOUNTER FOR OTHER GENERAL EXAMINATION: ICD-10-CM

## 2021-08-31 PROCEDURE — 88342 IMHCHEM/IMCYTCHM 1ST ANTB: CPT

## 2021-08-31 PROCEDURE — 88305 TISSUE EXAM BY PATHOLOGIST: CPT | Mod: 26

## 2021-08-31 PROCEDURE — 77065 DX MAMMO INCL CAD UNI: CPT

## 2021-08-31 PROCEDURE — 88342 IMHCHEM/IMCYTCHM 1ST ANTB: CPT | Mod: 26

## 2021-08-31 PROCEDURE — 77065 DX MAMMO INCL CAD UNI: CPT | Mod: 26,LT

## 2021-08-31 PROCEDURE — A4648: CPT

## 2021-08-31 PROCEDURE — 19085 BX BREAST 1ST LESION MR IMAG: CPT | Mod: LT

## 2021-08-31 PROCEDURE — 19085 BX BREAST 1ST LESION MR IMAG: CPT

## 2021-08-31 PROCEDURE — A9585: CPT

## 2021-08-31 PROCEDURE — 88305 TISSUE EXAM BY PATHOLOGIST: CPT

## 2021-09-15 ENCOUNTER — RESULT REVIEW (OUTPATIENT)
Age: 56
End: 2021-09-15

## 2021-09-15 ENCOUNTER — APPOINTMENT (OUTPATIENT)
Dept: ULTRASOUND IMAGING | Facility: CLINIC | Age: 56
End: 2021-09-15
Payer: SELF-PAY

## 2021-09-15 ENCOUNTER — OUTPATIENT (OUTPATIENT)
Dept: OUTPATIENT SERVICES | Facility: HOSPITAL | Age: 56
LOS: 1 days | End: 2021-09-15
Payer: COMMERCIAL

## 2021-09-15 DIAGNOSIS — Z00.8 ENCOUNTER FOR OTHER GENERAL EXAMINATION: ICD-10-CM

## 2021-09-15 PROCEDURE — 76642 ULTRASOUND BREAST LIMITED: CPT | Mod: 26,LT

## 2021-09-15 PROCEDURE — 76642 ULTRASOUND BREAST LIMITED: CPT

## 2022-01-28 ENCOUNTER — APPOINTMENT (OUTPATIENT)
Dept: MAMMOGRAPHY | Facility: CLINIC | Age: 57
End: 2022-01-28
Payer: COMMERCIAL

## 2022-01-28 ENCOUNTER — APPOINTMENT (OUTPATIENT)
Dept: ULTRASOUND IMAGING | Facility: CLINIC | Age: 57
End: 2022-01-28
Payer: COMMERCIAL

## 2022-01-28 ENCOUNTER — OUTPATIENT (OUTPATIENT)
Dept: OUTPATIENT SERVICES | Facility: HOSPITAL | Age: 57
LOS: 1 days | End: 2022-01-28
Payer: COMMERCIAL

## 2022-01-28 DIAGNOSIS — Z12.31 ENCOUNTER FOR SCREENING MAMMOGRAM FOR MALIGNANT NEOPLASM OF BREAST: ICD-10-CM

## 2022-01-28 PROCEDURE — 77063 BREAST TOMOSYNTHESIS BI: CPT | Mod: 26

## 2022-01-28 PROCEDURE — 77067 SCR MAMMO BI INCL CAD: CPT | Mod: 26

## 2022-01-28 PROCEDURE — 76641 ULTRASOUND BREAST COMPLETE: CPT | Mod: 26,50

## 2022-01-28 PROCEDURE — 76641 ULTRASOUND BREAST COMPLETE: CPT

## 2022-01-28 PROCEDURE — 77063 BREAST TOMOSYNTHESIS BI: CPT

## 2022-01-28 PROCEDURE — 77067 SCR MAMMO BI INCL CAD: CPT

## 2022-02-24 ENCOUNTER — TRANSCRIPTION ENCOUNTER (OUTPATIENT)
Age: 57
End: 2022-02-24

## 2022-05-05 ENCOUNTER — APPOINTMENT (OUTPATIENT)
Dept: SURGICAL ONCOLOGY | Facility: CLINIC | Age: 57
End: 2022-05-05

## 2022-05-12 ENCOUNTER — NON-APPOINTMENT (OUTPATIENT)
Age: 57
End: 2022-05-12

## 2022-05-25 ENCOUNTER — APPOINTMENT (OUTPATIENT)
Dept: GASTROENTEROLOGY | Facility: CLINIC | Age: 57
End: 2022-05-25
Payer: COMMERCIAL

## 2022-05-25 VITALS
HEIGHT: 64 IN | WEIGHT: 108 LBS | DIASTOLIC BLOOD PRESSURE: 83 MMHG | BODY MASS INDEX: 18.44 KG/M2 | HEART RATE: 89 BPM | SYSTOLIC BLOOD PRESSURE: 135 MMHG

## 2022-05-25 PROCEDURE — 99204 OFFICE O/P NEW MOD 45 MIN: CPT

## 2022-06-01 NOTE — HISTORY OF PRESENT ILLNESS
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 \par \par In terms of her pancreatic cyst, had an EUS of the cyst as it was enlarging but on EUS it was not aspirated as much smaller and had no debris in the cyst. During that endoscopy a nodule was seen in the duodenum that resulted as low grade follicular lymphoma. She was also diagnosed with a small 2 mm gallblader polyp on imaging as well. \par \par Patient is currently asymptomatic. Is eating well without weight loss. No post prandial issues like nausea or vomitiing. She has a good appetite and is able to eat food well. No overt signs of GI bleeding like hematemesis, melena, hematochezia.

## 2022-06-01 NOTE — ASSESSMENT
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 \par \par In terms of the gallbladder polyp, it is 2 mm. She is asymptomatic. Giudelines are to just repeat imaging in 6 months then yearly ultrasound. \par \par For pancreatic cyst, had debris, was enlarging on prior imaging but EUS findings showed a non-concerning cyst. Will repeat MRI/MRCP. \par \par Therefore, will order MRI for both of these in three months. \par \par In terms of the low grade follicular lymphoma, per oncologist note just needs observation and repeat EGD this year, will book in three months as well.

## 2022-06-22 ENCOUNTER — APPOINTMENT (OUTPATIENT)
Dept: GASTROENTEROLOGY | Facility: CLINIC | Age: 57
End: 2022-06-22
Payer: COMMERCIAL

## 2022-06-22 VITALS
SYSTOLIC BLOOD PRESSURE: 127 MMHG | WEIGHT: 110 LBS | BODY MASS INDEX: 18.78 KG/M2 | HEART RATE: 88 BPM | HEIGHT: 64 IN | DIASTOLIC BLOOD PRESSURE: 74 MMHG

## 2022-06-22 DIAGNOSIS — C82.90 FOLLICULAR LYMPHOMA, UNSPECIFIED, UNSPECIFIED SITE: ICD-10-CM

## 2022-06-22 PROCEDURE — 99211 OFF/OP EST MAY X REQ PHY/QHP: CPT

## 2022-06-29 PROBLEM — C82.90 FOLLICULAR LYMPHOMA: Status: ACTIVE | Noted: 2022-06-01

## 2022-06-29 NOTE — HISTORY OF PRESENT ILLNESS
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 \par \par Saw patient a few weeks ago, please refer to that note. She was confused about the plan and since it is complicated, we reviewed it in great detail again.

## 2022-06-29 NOTE — ASSESSMENT
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 \par \par Saw patient a few weeks ago, please refer to that note. She was confused about the plan and since it is complicated, we reviewed it in great detail again. \par \par For cyst and GB polyps, MRI later this year. \par EGD later this year. \par \par She has a busy summer and would like to schedule these in September/October. This is fine as these can be done in 3-6 months. She will call me later this when she is ready to schedule and we will order tests and book procedure.

## 2022-08-25 ENCOUNTER — NON-APPOINTMENT (OUTPATIENT)
Age: 57
End: 2022-08-25

## 2022-08-25 ENCOUNTER — APPOINTMENT (OUTPATIENT)
Dept: MRI IMAGING | Facility: CLINIC | Age: 57
End: 2022-08-25

## 2022-08-29 ENCOUNTER — OUTPATIENT (OUTPATIENT)
Dept: OUTPATIENT SERVICES | Facility: HOSPITAL | Age: 57
LOS: 1 days | Discharge: HOME | End: 2022-08-29
Payer: COMMERCIAL

## 2022-08-29 ENCOUNTER — APPOINTMENT (OUTPATIENT)
Dept: GASTROENTEROLOGY | Facility: HOSPITAL | Age: 57
End: 2022-08-29

## 2022-08-29 ENCOUNTER — RESULT REVIEW (OUTPATIENT)
Age: 57
End: 2022-08-29

## 2022-08-29 VITALS
TEMPERATURE: 98 F | SYSTOLIC BLOOD PRESSURE: 130 MMHG | HEIGHT: 64 IN | WEIGHT: 110.23 LBS | RESPIRATION RATE: 14 BRPM | HEART RATE: 90 BPM | OXYGEN SATURATION: 100 % | DIASTOLIC BLOOD PRESSURE: 88 MMHG

## 2022-08-29 DIAGNOSIS — Z98.890 OTHER SPECIFIED POSTPROCEDURAL STATES: Chronic | ICD-10-CM

## 2022-08-29 DIAGNOSIS — C82.90 FOLLICULAR LYMPHOMA, UNSPECIFIED, UNSPECIFIED SITE: ICD-10-CM

## 2022-08-29 PROCEDURE — 88305 TISSUE EXAM BY PATHOLOGIST: CPT

## 2022-08-29 PROCEDURE — 88305 TISSUE EXAM BY PATHOLOGIST: CPT | Mod: 26

## 2022-08-29 PROCEDURE — 43239 EGD BIOPSY SINGLE/MULTIPLE: CPT | Mod: GC

## 2022-08-29 NOTE — ASU PATIENT PROFILE, ADULT - FALL HARM RISK - UNIVERSAL INTERVENTIONS
Bed in lowest position, wheels locked, appropriate side rails in place/Call bell, personal items and telephone in reach/Instruct patient to call for assistance before getting out of bed or chair/Non-slip footwear when patient is out of bed/[place] to call system/Physically safe environment - no spills, clutter or unnecessary equipment/Purposeful Proactive Rounding/Room/bathroom lighting operational, light cord in reach

## 2022-08-29 NOTE — ASU PATIENT PROFILE, ADULT - PRO MENTAL HEALTH SX RECENT
none
COVID-19 (Coronavirus Disease 2019)    WHAT YOU NEED TO KNOW:    COVID-19 is the disease caused by a coronavirus first discovered in December 2019. Coronaviruses generally cause upper respiratory (nose, throat, and lung) infections, such as a cold. The 2019 virus spreads quickly and easily. It can be spread starting 2 to 3 days before symptoms even begin.    DISCHARGE INSTRUCTIONS:    Call your local emergency number (911 in the ) if:   •You have trouble breathing or shortness of breath at rest.      •You have chest pain or pressure that lasts longer than 5 minutes.      •You become confused or hard to wake.      •Your lips or face are blue.      Return to the emergency department if:   •You have a fever of 104°F (40°C) or higher.          Call your doctor if:   •You have symptoms of COVID-19.      •You have questions or concerns about your condition or care.      Medicines: You may need any of the following:  •Decongestants help reduce nasal congestion and help you breathe more easily. If you take decongestant pills, they may make you feel restless or cause problems with your sleep. Do not use decongestant sprays for more than a few days.      •Cough suppressants help reduce coughing. Ask your healthcare provider which type of cough medicine is best for you.      •Acetaminophen decreases pain and fever. It is available without a doctor's order. Ask how much to take and how often to take it. Follow directions. Read the labels of all other medicines you are using to see if they also contain acetaminophen, or ask your doctor or pharmacist. Acetaminophen can cause liver damage if not taken correctly.       •NSAIDs, such as ibuprofen, help decrease swelling, pain, and fever. This medicine is available with or without a doctor's order. NSAIDs can cause stomach bleeding or kidney problems in certain people. If you take blood thinner medicine, always ask your healthcare provider if NSAIDs are safe for you. Always read the medicine label and follow directions.      •Blood thinners help prevent blood clots. Clots can cause strokes, heart attacks, and death. The following are general safety guidelines to follow while you are taking a blood thinner:?Watch for bleeding and bruising while you take blood thinners. Watch for bleeding from your gums or nose. Watch for blood in your urine and bowel movements. Use a soft washcloth on your skin, and a soft toothbrush to brush your teeth. This can keep your skin and gums from bleeding. If you shave, use an electric shaver. Do not play contact sports.       ?Tell your dentist and other healthcare providers that you take a blood thinner. Wear a bracelet or necklace that says you take this medicine.       ?Do not start or stop any other medicines unless your healthcare provider tells you to. Many medicines cannot be used with blood thinners.      ?Take your blood thinner exactly as prescribed by your healthcare provider. Do not skip does or take less than prescribed. Tell your provider right away if you forget to take your blood thinner, or if you take too much.      ?Warfarin is a blood thinner that you may need to take. The following are things you should be aware of if you take warfarin: ?Foods and medicines can affect the amount of warfarin in your blood. Do not make major changes to your diet while you take warfarin. Warfarin works best when you eat about the same amount of vitamin K every day. Vitamin K is found in green leafy vegetables and certain other foods. Ask for more information about what to eat when you are taking warfarin.      ?You will need to see your healthcare provider for follow-up visits when you are on warfarin. You will need regular blood tests. These tests are used to decide how much medicine you need.         •Take your medicine as directed. Contact your healthcare provider if you think your medicine is not helping or if you have side effects. Tell him or her if you are allergic to any medicine. Keep a list of the medicines, vitamins, and herbs you take. Include the amounts, and when and why you take them. Bring the list or the pill bottles to follow-up visits. Carry your medicine list with you in case of an emergency.      What you need to know about variants: The virus has changed into several new forms (called variants) since it was discovered. The variants may be more contagious (easily spread) than the original form. Some may also cause more severe illness than others.    What you need to know about COVID-19 vaccines: Healthcare providers recommend a COVID-19 vaccine, even if you have already had COVID-19. You are considered fully vaccinated against COVID-19 two weeks after the final dose of any COVID-19 vaccine. Let your healthcare provider know when you have received the final dose of the vaccine. Make a copy of your vaccination card. Keep the original with you in case you need to show it. Keep the copy in a safe place.  •COVID-19 vaccines are given as a shot in 1 or 2 doses.   Vaccination is recommended for everyone 5 years or older.?One 2-dose vaccine is fully approved for those 16 years or older. This vaccine also has an emergency use authorization (EUA) for children 5 to 15 years old. An EUA means the vaccine is not officially approved but is used because the benefits outweigh the risks. No vaccine is currently available for children younger than 5 years.      ?One 2-dose vaccine is fully approved for adults 18 years or older. This vaccine is not currently given to children 17 years or younger.      ?A booster (additional) dose helps the immune system continue to protect against severe COVID-19.?A booster is recommended for all adults 18 or older. The booster can be a different brand of the COVID-19 vaccine than you originally received. The timing for the booster depends on the type of vaccine you received:?1-dose vaccine: The booster is given at least 2 months after you received the vaccine.      ?2-dose vaccine: The booster is given at least 5 or 6 months after the second dose. A second booster is recommended for all adults 50 or older and for immunocompromised adults 18 or older. Your healthcare provider will tell you when to get this booster.      ?A booster can be given to adolescents 12 to 17 years old. Only 1 COVID-19 vaccine has this EUA. The booster is given at least 5 months after the second dose of the original vaccine series. A second booster is recommended for immunocompromised adolescents. Your adolescent's healthcare provider will tell you when this booster should be given.      ?A booster is recommended for immunocompromised children 5 to 11 years old. Only 1 COVID-19 vaccine has this EUA. The booster is given 28 days after the second dose.      COVID-19 Immunization Schedule         •Continue social distancing and other measures. You can become infected even after you get the vaccine. You may also be able to pass the virus to others without knowing you are infected.      •After you get the vaccine, check local, national, and international travel rules. You may need to be tested before you travel. Some countries require proof of a negative test before you travel. You may also need to quarantine after you return.      •Medicine may be given to prevent infection. The medicine can be given if you are at high risk for infection and cannot get the vaccine. It can also be given if your immune system does not respond well to the vaccine.      How the 2019 coronavirus spreads:   •Droplets are the main way all coronaviruses spread. The virus travels in droplets that form when a person talks, sings, coughs, or sneezes. The droplets can also float in the air for minutes or hours. Infection happens when you breathe in the droplets or get them in your eyes or nose. Close personal contact with an infected person increases your risk for infection. This means being within 6 feet (2 meters) of the person for at least 15 minutes over 24 hours.      •Person-to-person contact can spread the virus. For example, a person with the virus on his or her hands can spread it by shaking hands with someone.      •The virus can stay on objects and surfaces for up to 3 days. You may become infected by touching the object or surface and then touching your eyes or mouth.      Help lower the risk for COVID-19:   •Wash your hands often throughout the day. Use soap and water. Rub your soapy hands together, lacing your fingers, for at least 20 seconds. Rinse with warm, running water. Dry your hands with a clean towel or paper towel. Use hand  that contains alcohol if soap and water are not available. Teach children how to wash their hands and use hand .  Handwashing           •Cover sneezes and coughs. Turn your face away and cover your mouth and nose with a tissue. Throw the tissue away. Use the bend of your arm if a tissue is not available. Then wash your hands well with soap and water or use hand . Teach children how to cover a cough or sneeze.      •Wear a face covering (mask) when needed. Use a cloth covering with at least 2 layers. You can also create layers by putting a cloth covering over a disposable non-medical mask. Cover your mouth and your nose.  How to Wear a Face Covering (Mask)           •Follow worldwide, national, and local social distancing guidelines. Keep at least 6 feet (2 meters) between you and others.      •Try not to touch your face. If you get the virus on your hands, you can transfer it to your eyes, nose, or mouth and become infected. You can also transfer it to objects, surfaces, or people.      •Clean and disinfect high-touch surfaces and objects often. Use disinfecting wipes, or make a solution of 4 teaspoons of bleach in 1 quart (4 cups) of water.      •Ask about other vaccines you may need. Get the influenza (flu) vaccine as soon as recommended each year, usually starting in September or October. Get the pneumonia vaccine if recommended. Your healthcare provider can tell you if you should also get other vaccines, and when to get them.    Prevent COVID-19 Infection         Follow social distancing guidelines: National and local social distancing rules vary. Rules and restrictions may change over time as restrictions are lifted. The following are general guidelines:  •Stay home if you are sick or think you may have COVID-19. It is important to stay home if you are waiting for a testing appointment or for test results.      •Avoid close physical contact with anyone who does not live in your home. Do not shake hands with, hug, or kiss a person as a greeting. If you must use public transportation (such as a bus or subway), try to sit or stand away from others. Wear your face covering.      •Avoid in-person gatherings and crowds. Attend virtually if possible.      Follow up with your doctor as directed: Write down your questions so you remember to ask them during your visits.    For more information:   •Centers for Disease Control and Prevention  [address] [place] [address]  Phone: 1-476.753.8865  Web Address: http://www.cdc.gov           © Copyright [employer] 2022

## 2022-08-31 ENCOUNTER — NON-APPOINTMENT (OUTPATIENT)
Age: 57
End: 2022-08-31

## 2022-08-31 LAB — SURGICAL PATHOLOGY STUDY: SIGNIFICANT CHANGE UP

## 2022-09-02 DIAGNOSIS — K29.80 DUODENITIS WITHOUT BLEEDING: ICD-10-CM

## 2022-09-02 DIAGNOSIS — Z87.19 PERSONAL HISTORY OF OTHER DISEASES OF THE DIGESTIVE SYSTEM: ICD-10-CM

## 2022-09-02 DIAGNOSIS — K21.9 GASTRO-ESOPHAGEAL REFLUX DISEASE WITHOUT ESOPHAGITIS: ICD-10-CM

## 2022-09-02 DIAGNOSIS — K31.89 OTHER DISEASES OF STOMACH AND DUODENUM: ICD-10-CM

## 2022-09-02 DIAGNOSIS — J45.909 UNSPECIFIED ASTHMA, UNCOMPLICATED: ICD-10-CM

## 2022-09-12 PROBLEM — K21.9 GASTRO-ESOPHAGEAL REFLUX DISEASE WITHOUT ESOPHAGITIS: Chronic | Status: ACTIVE | Noted: 2022-08-29

## 2022-09-12 PROBLEM — J45.909 UNSPECIFIED ASTHMA, UNCOMPLICATED: Chronic | Status: ACTIVE | Noted: 2022-08-29

## 2022-09-30 ENCOUNTER — APPOINTMENT (OUTPATIENT)
Dept: MRI IMAGING | Facility: CLINIC | Age: 57
End: 2022-09-30

## 2022-09-30 ENCOUNTER — OUTPATIENT (OUTPATIENT)
Dept: OUTPATIENT SERVICES | Facility: HOSPITAL | Age: 57
LOS: 1 days | End: 2022-09-30
Payer: COMMERCIAL

## 2022-09-30 DIAGNOSIS — Z98.890 OTHER SPECIFIED POSTPROCEDURAL STATES: Chronic | ICD-10-CM

## 2022-09-30 DIAGNOSIS — Z00.8 ENCOUNTER FOR OTHER GENERAL EXAMINATION: ICD-10-CM

## 2022-09-30 DIAGNOSIS — Z12.39 ENCOUNTER FOR OTHER SCREENING FOR MALIGNANT NEOPLASM OF BREAST: ICD-10-CM

## 2022-09-30 PROCEDURE — C8908: CPT

## 2022-09-30 PROCEDURE — 77049 MRI BREAST C-+ W/CAD BI: CPT | Mod: 26

## 2022-09-30 PROCEDURE — A9585: CPT

## 2022-09-30 PROCEDURE — C8937: CPT

## 2022-10-06 ENCOUNTER — APPOINTMENT (OUTPATIENT)
Dept: SURGICAL ONCOLOGY | Facility: CLINIC | Age: 57
End: 2022-10-06

## 2022-10-06 VITALS
BODY MASS INDEX: 18.44 KG/M2 | DIASTOLIC BLOOD PRESSURE: 76 MMHG | RESPIRATION RATE: 16 BRPM | HEIGHT: 64 IN | TEMPERATURE: 97.8 F | HEART RATE: 95 BPM | SYSTOLIC BLOOD PRESSURE: 127 MMHG | WEIGHT: 108 LBS | OXYGEN SATURATION: 97 %

## 2022-10-06 PROCEDURE — 99214 OFFICE O/P EST MOD 30 MIN: CPT

## 2022-10-06 NOTE — PHYSICAL EXAM
[Normal] : normal breast inspection and palpation of axillas [Normal Supraclavicular Lymph Nodes] : normal supraclavicular lymph nodes [Normal Axillary Lymph Nodes] : normal axillary lymph nodes [Normal] : oriented to person, place and time, with appropriate affect

## 2022-10-21 ENCOUNTER — OUTPATIENT (OUTPATIENT)
Dept: OUTPATIENT SERVICES | Facility: HOSPITAL | Age: 57
LOS: 1 days | End: 2022-10-21
Payer: COMMERCIAL

## 2022-10-21 ENCOUNTER — APPOINTMENT (OUTPATIENT)
Dept: MRI IMAGING | Facility: CLINIC | Age: 57
End: 2022-10-21

## 2022-10-21 DIAGNOSIS — C82.90 FOLLICULAR LYMPHOMA, UNSPECIFIED, UNSPECIFIED SITE: ICD-10-CM

## 2022-10-21 DIAGNOSIS — Z00.8 ENCOUNTER FOR OTHER GENERAL EXAMINATION: ICD-10-CM

## 2022-10-21 DIAGNOSIS — Z98.890 OTHER SPECIFIED POSTPROCEDURAL STATES: Chronic | ICD-10-CM

## 2022-10-21 PROCEDURE — 74183 MRI ABD W/O CNTR FLWD CNTR: CPT | Mod: 26

## 2022-10-21 PROCEDURE — A9585: CPT

## 2022-10-21 PROCEDURE — 74183 MRI ABD W/O CNTR FLWD CNTR: CPT

## 2022-10-25 ENCOUNTER — NON-APPOINTMENT (OUTPATIENT)
Age: 57
End: 2022-10-25

## 2022-11-17 ENCOUNTER — NON-APPOINTMENT (OUTPATIENT)
Age: 57
End: 2022-11-17

## 2022-11-17 ENCOUNTER — APPOINTMENT (OUTPATIENT)
Dept: OPHTHALMOLOGY | Facility: CLINIC | Age: 57
End: 2022-11-17

## 2022-11-17 PROCEDURE — 92002 INTRM OPH EXAM NEW PATIENT: CPT

## 2022-12-13 NOTE — ASSESSMENT
[FreeTextEntry1] : IMP:\par Fibrocystic change with a + family hx. of breast cancer in her mother\par BRCA Negative \par She is s/p left breast 9:00 FNA May 2019for cytology which showed an acellular specimen, blood and rare ductal cells. \par Nov 2019 underwent a Left MMG- no longer noted prior nodule/cyst (Birads 3).  Same day bilateral breast sono notable for innumerable cysts and fibrocystic nodules b/l.  Complicated 7mm complicated cyst left breast 9:00 periareolar position. Had an MRI of breast on 7/10/2020 which was without evidence of malignancy (BI-RADS 2). \par \par 1) B/L mammogram/sonogram performed on 1/27/2021 showed stable bilateral nodules, no evidence of malignancy.  BIRADS 2.  \par 2) B/L Breast MRI 8/17/2021 showed an 8 x 2 mm ovoid mass of persistent enhancement in the upper outer posterior left breast for which targeted left breast ultrasound is recommended.  If ultrasound is noncontributory, consider MRI guided biopsy. BIRADS 0.   \par 3) Targeted left breast ultrasound performed on 8/23/2021 with benign findings.  No sonographic correlate to MRI finding.  Therefore, MRI guided biopsy would be required. BIRADS 4A.  \par No suspicious findings on exam \par \par Plan:\par 1) MMG/US Jan 2023 ordered\par 2) MRI Sept 2023 ordered\par 3) return yearly for CBE

## 2022-12-13 NOTE — CONSULT LETTER
[Dear  ___] : Dear  [unfilled], [Courtesy Letter:] : I had the pleasure of seeing your patient, [unfilled], in my office today. [Please see my note below.] : Please see my note below. [Consult Closing:] : Thank you very much for allowing me to participate in the care of this patient.  If you have any questions, please do not hesitate to contact me. [Sincerely,] : Sincerely, [FreeTextEntry3] : Mj Andrade MD, MPH, FACS\par Surgical Oncology\par Assistant Professor of Surgery\par [facility] [employer] [facility] \par \par

## 2022-12-13 NOTE — HISTORY OF PRESENT ILLNESS
[de-identified] : Patient is a 57 year old female who presents to the office for a yearly follow up visit. \par \par Past Hx: \par s/p benign RIGHT breast biopsy on 11/8/2012; s/p RIGHT benign breast biopsy on 5/17/16; + family history of Breast CA (mother- age 57/ paternal aunt- age 39-40); Patient is BRCA Negative.  \par \par Mammography done on 4/14/17 which showed benign appearing bilateral calcifications, no suspicious masses and/or calcifications, no evidence of malignancy; BIRADS 2. Ultrasound on 4/14/17 showed multiple cysts, some with debris, and benign-appearing circumscribed hypoechoic nodules bilaterally; largest cyst in LEFT breast at 9-10:00 measuring 5.7 cm; largest cyst in RIGHT breast at 1:00 measuring 1.9 cm; BIRADS 3. \par \par Bilateral breast sonogram performed on 10/17/17 revealed no significant change to prior ultrasound.  Multiple cysts, some with debris, and benign appearing circumscribed hypoechoic nodules are again identified bilaterally.  There is no significant change in either breast.  The largest cyst is currently in the left breast at the 6:00 through 9:00 regions, measuring 6.1 cm.  The largest cyst in the right breast 1:00 measuring 2.0 cm. No suspicious axillary lymphadenopathy.  BIRADS 3 findings. \par \par At this time patient denies breast pain, palpable masses, skin changes and/or nipple discharge.  Reports occasional breast discomfort which is cyclic in nature. LMP: 10/20/17\par \par INTERVAL HISTORY:\par 4/2018 Mammo and US - BIRADS 3\par - multiple benign-appearing nodules are seen bilaterally.  No new suspicious solid finding.  A repeat bilateral breast US is advised in 6 months for re-evaluation.  Left dx mammo for probably benign microcalcifications are also advised.\par Internist: Dr. Joanne Angeles\par \par 9/2018 - c/o left breast pain at site of multiple cysts on last US - planning for bilateral US and left dx mammo in 11/2018\par \par 10/17/18 - Diagnostic left mammogram - extremely dense breast tissue, probably benign microcalcifications are again identified in the upper outer posterior left breast with no significant changes, a circumscribed mass in the retroareolar region of the left breast is again noted, shown to be a large cyst. BIRADS 3  B/L Ultrasound performed on the same day showed innumerable cysts and fibrocystic nodules bilaterally, some complicated with internal debris. Of note, at the retroareolar position of the left breast, a large cyst is again noted measuring 4.1 x 2.1 x 3.8 cm. Increased in size and causing her discomfort. \par \par 11/8/18 - Here for left retroareolar cyst aspiration. \par \par 1/15/19 - The patient is unsure whether she has right retroareolar skin dimpling in the 3-4:00 axis. States she is "lumpy" but denies any distinct masses or nipple discharge bilaterally. She is scheduled for breast imaging in 4/2019. \par \par 5/16/19 - MAMMOGRAM/SONOGRAM - Left 9:00 periareolar axis, a 1.4 x 1.0 x 1.3 cm complex cyst is identified, correlating with mammogram.  This may be the residual of the previously noted 4.1 cm cyst in the periareolar region.  As there is either internal debris or a solid component, US guided FNA is advised.  No suspicious axillary lymph nodes. BIRADS 4a. \par \par She is s/p left breast 9:00 FNA for cytology which showed an acellular specimen, blood and rare ductal cells. \par \par 7/2/19 - Has c/o occasional bilateral breast tenderness but denies palpable breast masses or nipple discharge.  LMP: 3/31/19 \par \par 7/16/2020- In Nov 2019 underwent a Left MMG- no longer noted prior nodule/cyst (Birads 3).  Same day bilateral breast sono notable for innumerable cysts and fibrocystic nodules b/l.  Complicated 7mm complicated cyst left breast 9:00 periareolar position.\par 7/16/2020- Had an MRI of breast on 7/10/2020 which was without evidence of malignancy (BI-RADS 2).  Had an MRI of breast on 7/10/2020 which was without evidence of malignancy (BI-RADS 2).  \par \par 8/26/2021- B/L mammogram/sonogram performed on 1/27/2021 showed stable bilateral nodules, no evidence of malignancy.  BIRADS 2.  B/L Breast MRI 8/17/2021 showed an 8 x 2 mm ovoid mass of persistent enhancement in the upper outer posterior left breast for which targeted left breast ultrasound is recommended.  If ultrasound is noncontributory, consider MRI guided biopsy. BIRADS 0.   Targeted left breast ultrasound performed on 8/23/2021 with benign findings.  No sonographic correlate to MRI finding.  Therefore, MRI guided biopsy would be required. BIRADS 4A.   Pt. denies palpable breast masses, nipple discharge, skin dimpling, inversion or breast pain. \par \par Left breast US on 9/15/21 showed The palpable nodule by the patient the 11:00 left breast at 6 cm from the nipple corresponds to a postbiopsy hematoma/seroma. BI-RADS 2\par \par Screening MMG/US 1/28/22- No evidence of malignancy. Multiple stable hypoechoic nodules  BI-RADS 2. Given history and family history pt will benefit for MRI \par MRI on 9/30/22 revealed  No MRI evidence of malignancy. Status post benign left breast MRI guided biopsy, otherwise no significant change from prior breast MRI studies. BI-RADS 2\par \par 10/6/22- Patient doing well today, She denies palpable breast masses, nipple discharge, skin changes, inversion or breast pain. Denies constitutional symptoms. No health changes. 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

## 2023-02-03 ENCOUNTER — RESULT REVIEW (OUTPATIENT)
Age: 58
End: 2023-02-03

## 2023-02-03 ENCOUNTER — APPOINTMENT (OUTPATIENT)
Dept: ULTRASOUND IMAGING | Facility: CLINIC | Age: 58
End: 2023-02-03
Payer: COMMERCIAL

## 2023-02-03 ENCOUNTER — APPOINTMENT (OUTPATIENT)
Dept: MAMMOGRAPHY | Facility: CLINIC | Age: 58
End: 2023-02-03
Payer: COMMERCIAL

## 2023-02-03 ENCOUNTER — OUTPATIENT (OUTPATIENT)
Dept: OUTPATIENT SERVICES | Facility: HOSPITAL | Age: 58
LOS: 1 days | End: 2023-02-03
Payer: COMMERCIAL

## 2023-02-03 DIAGNOSIS — Z12.39 ENCOUNTER FOR OTHER SCREENING FOR MALIGNANT NEOPLASM OF BREAST: ICD-10-CM

## 2023-02-03 DIAGNOSIS — Z98.890 OTHER SPECIFIED POSTPROCEDURAL STATES: Chronic | ICD-10-CM

## 2023-02-03 PROCEDURE — 77067 SCR MAMMO BI INCL CAD: CPT | Mod: 26

## 2023-02-03 PROCEDURE — 76641 ULTRASOUND BREAST COMPLETE: CPT | Mod: 26,50

## 2023-02-03 PROCEDURE — 77063 BREAST TOMOSYNTHESIS BI: CPT | Mod: 26

## 2023-02-03 PROCEDURE — 77063 BREAST TOMOSYNTHESIS BI: CPT

## 2023-02-03 PROCEDURE — 77067 SCR MAMMO BI INCL CAD: CPT

## 2023-02-03 PROCEDURE — 76641 ULTRASOUND BREAST COMPLETE: CPT

## 2023-05-11 ENCOUNTER — APPOINTMENT (OUTPATIENT)
Dept: RADIOLOGY | Facility: CLINIC | Age: 58
End: 2023-05-11

## 2023-07-12 ENCOUNTER — APPOINTMENT (OUTPATIENT)
Dept: GASTROENTEROLOGY | Facility: CLINIC | Age: 58
End: 2023-07-12
Payer: COMMERCIAL

## 2023-07-12 VITALS
WEIGHT: 108 LBS | HEART RATE: 107 BPM | DIASTOLIC BLOOD PRESSURE: 87 MMHG | BODY MASS INDEX: 18.44 KG/M2 | SYSTOLIC BLOOD PRESSURE: 153 MMHG | HEIGHT: 64 IN

## 2023-07-12 DIAGNOSIS — K92.1 MELENA: ICD-10-CM

## 2023-07-12 PROCEDURE — 99214 OFFICE O/P EST MOD 30 MIN: CPT

## 2023-07-18 PROBLEM — K92.1 HEMATOCHEZIA: Status: ACTIVE | Noted: 2023-07-18

## 2023-07-18 NOTE — PHYSICAL EXAM
[Alert] : alert [Normal Voice/Communication] : normal voice/communication [Healthy Appearing] : healthy appearing [No Acute Distress] : no acute distress [Sclera] : the sclera and conjunctiva were normal [Hearing Threshold Finger Rub Not [place]] : hearing was normal [Normal Lips/Gums] : the lips and gums were normal [Oropharynx] : the oropharynx was normal [Normal Appearance] : the appearance of the neck was normal [No Neck Mass] : no neck mass was observed [Abdomen Tenderness] : non-tender [Abdomen Soft] : soft [Abnormal Walk] : normal gait [No Clubbing, Cyanosis] : no clubbing or cyanosis of the fingernails [Normal Color / Pigmentation] : normal skin color and pigmentation [] : no rash [Skin Lesions] : no skin lesions [No Focal Deficits] : no focal deficits [Oriented To Time, Place, And Person] : oriented to person, place, and time [Motor Exam] : the motor exam was normal

## 2023-07-18 NOTE — HISTORY OF PRESENT ILLNESS
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 \par \par Patient states she gets intermittent rectal bleeding. Blood is small amount, on toilet paper/around stool, typically when hard. Bleeding is painless. Just started a few months ago but has had before in her life. No abdominal pain. No cramping. No weight loss. No other overt signs of GI bleeding like hematemesis, melena. Good appetite. Stable weight.

## 2023-07-18 NOTE — ASSESSMENT
[FreeTextEntry1] : 58 year old woman with low grade GI follicular lymphoma, known small pancreatic cyst and GB polyps, here for rectal bleeding. \par \par Will plan for colonoscopy for evaluation of rectal bleeding. Will add on upper endoscopy as history of GI low grade follicular lymphoma. \par \par Will also order an MRI/MRCP for pancreatic cyst and GB polyps. \par \par All questions answered. \par \par Miralax prep ordered for colonoscopy.  \par \par

## 2023-07-25 ENCOUNTER — OUTPATIENT (OUTPATIENT)
Dept: OUTPATIENT SERVICES | Facility: HOSPITAL | Age: 58
LOS: 1 days | End: 2023-07-25
Payer: COMMERCIAL

## 2023-07-25 ENCOUNTER — APPOINTMENT (OUTPATIENT)
Dept: MRI IMAGING | Facility: CLINIC | Age: 58
End: 2023-07-25
Payer: COMMERCIAL

## 2023-07-25 DIAGNOSIS — Z98.890 OTHER SPECIFIED POSTPROCEDURAL STATES: Chronic | ICD-10-CM

## 2023-07-25 DIAGNOSIS — K86.2 CYST OF PANCREAS: ICD-10-CM

## 2023-07-25 DIAGNOSIS — Z00.8 ENCOUNTER FOR OTHER GENERAL EXAMINATION: ICD-10-CM

## 2023-07-25 PROCEDURE — 74183 MRI ABD W/O CNTR FLWD CNTR: CPT | Mod: 26

## 2023-07-25 PROCEDURE — 74183 MRI ABD W/O CNTR FLWD CNTR: CPT

## 2023-07-25 PROCEDURE — A9585: CPT

## 2023-08-14 ENCOUNTER — NON-APPOINTMENT (OUTPATIENT)
Age: 58
End: 2023-08-14

## 2023-08-18 ENCOUNTER — OUTPATIENT (OUTPATIENT)
Dept: OUTPATIENT SERVICES | Facility: HOSPITAL | Age: 58
LOS: 1 days | Discharge: HOME | End: 2023-08-18
Payer: COMMERCIAL

## 2023-08-18 ENCOUNTER — RESULT REVIEW (OUTPATIENT)
Age: 58
End: 2023-08-18

## 2023-08-18 ENCOUNTER — APPOINTMENT (OUTPATIENT)
Dept: GASTROENTEROLOGY | Facility: HOSPITAL | Age: 58
End: 2023-08-18
Payer: COMMERCIAL

## 2023-08-18 VITALS
SYSTOLIC BLOOD PRESSURE: 132 MMHG | TEMPERATURE: 99 F | DIASTOLIC BLOOD PRESSURE: 87 MMHG | WEIGHT: 106.92 LBS | RESPIRATION RATE: 25 BRPM | OXYGEN SATURATION: 100 % | HEIGHT: 64 IN | HEART RATE: 96 BPM

## 2023-08-18 DIAGNOSIS — K64.8 OTHER HEMORRHOIDS: ICD-10-CM

## 2023-08-18 DIAGNOSIS — K21.9 GASTRO-ESOPHAGEAL REFLUX DISEASE WITHOUT ESOPHAGITIS: ICD-10-CM

## 2023-08-18 DIAGNOSIS — Z98.890 OTHER SPECIFIED POSTPROCEDURAL STATES: Chronic | ICD-10-CM

## 2023-08-18 DIAGNOSIS — C82.93 FOLLICULAR LYMPHOMA, UNSPECIFIED, INTRA-ABDOMINAL LYMPH NODES: ICD-10-CM

## 2023-08-18 DIAGNOSIS — K62.5 HEMORRHAGE OF ANUS AND RECTUM: ICD-10-CM

## 2023-08-18 DIAGNOSIS — K92.1 MELENA: ICD-10-CM

## 2023-08-18 DIAGNOSIS — K29.80 DUODENITIS WITHOUT BLEEDING: ICD-10-CM

## 2023-08-18 DIAGNOSIS — J45.909 UNSPECIFIED ASTHMA, UNCOMPLICATED: ICD-10-CM

## 2023-08-18 DIAGNOSIS — C82.90 FOLLICULAR LYMPHOMA, UNSPECIFIED, UNSPECIFIED SITE: ICD-10-CM

## 2023-08-18 DIAGNOSIS — D49.0 NEOPLASM OF UNSPECIFIED BEHAVIOR OF DIGESTIVE SYSTEM: ICD-10-CM

## 2023-08-18 PROCEDURE — 88305 TISSUE EXAM BY PATHOLOGIST: CPT

## 2023-08-18 PROCEDURE — 45378 DIAGNOSTIC COLONOSCOPY: CPT | Mod: GC

## 2023-08-18 PROCEDURE — 43239 EGD BIOPSY SINGLE/MULTIPLE: CPT | Mod: GC,59

## 2023-08-18 PROCEDURE — 88305 TISSUE EXAM BY PATHOLOGIST: CPT | Mod: 26

## 2023-08-18 RX ORDER — OMEPRAZOLE 10 MG/1
1 CAPSULE, DELAYED RELEASE ORAL
Qty: 0 | Refills: 0 | DISCHARGE

## 2023-08-18 RX ORDER — MONTELUKAST 4 MG/1
1 TABLET, CHEWABLE ORAL
Qty: 0 | Refills: 0 | DISCHARGE

## 2023-08-18 RX ORDER — METRONIDAZOLE 7.5 MG/G
0 GEL VAGINAL
Qty: 0 | Refills: 0 | DISCHARGE

## 2023-08-18 NOTE — ASU PREOP CHECKLIST - TAMPON REMOVED
Is This A New Presentation, Or A Follow-Up?: Skin Lesion What Type Of Note Output Would You Prefer (Optional)?: Standard Output How Severe Is Your Skin Lesion?: mild Has Your Skin Lesion Been Treated?: not been treated Additional History: UBSC n/a

## 2023-08-29 LAB — SURGICAL PATHOLOGY STUDY: SIGNIFICANT CHANGE UP

## 2023-10-06 ENCOUNTER — APPOINTMENT (OUTPATIENT)
Dept: MRI IMAGING | Facility: CLINIC | Age: 58
End: 2023-10-06
Payer: COMMERCIAL

## 2023-10-06 ENCOUNTER — OUTPATIENT (OUTPATIENT)
Dept: OUTPATIENT SERVICES | Facility: HOSPITAL | Age: 58
LOS: 1 days | End: 2023-10-06
Payer: COMMERCIAL

## 2023-10-06 DIAGNOSIS — R92.8 OTHER ABNORMAL AND INCONCLUSIVE FINDINGS ON DIAGNOSTIC IMAGING OF BREAST: ICD-10-CM

## 2023-10-06 DIAGNOSIS — Z98.890 OTHER SPECIFIED POSTPROCEDURAL STATES: Chronic | ICD-10-CM

## 2023-10-06 DIAGNOSIS — Z12.39 ENCOUNTER FOR OTHER SCREENING FOR MALIGNANT NEOPLASM OF BREAST: ICD-10-CM

## 2023-10-06 PROCEDURE — C8908: CPT

## 2023-10-06 PROCEDURE — C8937: CPT

## 2023-10-06 PROCEDURE — 77049 MRI BREAST C-+ W/CAD BI: CPT | Mod: 26

## 2023-10-06 PROCEDURE — A9585: CPT

## 2023-10-26 ENCOUNTER — APPOINTMENT (OUTPATIENT)
Dept: SURGICAL ONCOLOGY | Facility: CLINIC | Age: 58
End: 2023-10-26

## 2023-10-26 ENCOUNTER — NON-APPOINTMENT (OUTPATIENT)
Age: 58
End: 2023-10-26

## 2023-10-26 ENCOUNTER — APPOINTMENT (OUTPATIENT)
Dept: OPHTHALMOLOGY | Facility: CLINIC | Age: 58
End: 2023-10-26
Payer: COMMERCIAL

## 2023-10-26 PROCEDURE — 92012 INTRM OPH EXAM EST PATIENT: CPT

## 2023-11-27 ENCOUNTER — APPOINTMENT (OUTPATIENT)
Dept: OPHTHALMOLOGY | Facility: CLINIC | Age: 58
End: 2023-11-27
Payer: COMMERCIAL

## 2023-11-27 ENCOUNTER — NON-APPOINTMENT (OUTPATIENT)
Age: 58
End: 2023-11-27

## 2023-11-27 PROCEDURE — 92014 COMPRE OPH EXAM EST PT 1/>: CPT

## 2023-11-27 PROCEDURE — 92133 CPTRZD OPH DX IMG PST SGM ON: CPT

## 2023-11-30 ENCOUNTER — APPOINTMENT (OUTPATIENT)
Dept: SURGICAL ONCOLOGY | Facility: CLINIC | Age: 58
End: 2023-11-30
Payer: COMMERCIAL

## 2023-11-30 VITALS
BODY MASS INDEX: 18.67 KG/M2 | HEIGHT: 64 IN | WEIGHT: 109.38 LBS | HEART RATE: 96 BPM | OXYGEN SATURATION: 98 % | TEMPERATURE: 97.9 F | DIASTOLIC BLOOD PRESSURE: 79 MMHG | SYSTOLIC BLOOD PRESSURE: 135 MMHG

## 2023-11-30 DIAGNOSIS — Z12.39 ENCOUNTER FOR OTHER SCREENING FOR MALIGNANT NEOPLASM OF BREAST: ICD-10-CM

## 2023-11-30 PROCEDURE — 99024 POSTOP FOLLOW-UP VISIT: CPT

## 2023-12-02 PROBLEM — Z12.39 BREAST CANCER SCREENING, HIGH RISK PATIENT: Status: ACTIVE | Noted: 2019-01-15

## 2024-02-09 ENCOUNTER — APPOINTMENT (OUTPATIENT)
Dept: MAMMOGRAPHY | Facility: CLINIC | Age: 59
End: 2024-02-09
Payer: COMMERCIAL

## 2024-02-09 ENCOUNTER — RESULT REVIEW (OUTPATIENT)
Age: 59
End: 2024-02-09

## 2024-02-09 ENCOUNTER — OUTPATIENT (OUTPATIENT)
Dept: OUTPATIENT SERVICES | Facility: HOSPITAL | Age: 59
LOS: 1 days | End: 2024-02-09
Payer: COMMERCIAL

## 2024-02-09 ENCOUNTER — APPOINTMENT (OUTPATIENT)
Dept: ULTRASOUND IMAGING | Facility: CLINIC | Age: 59
End: 2024-02-09
Payer: COMMERCIAL

## 2024-02-09 DIAGNOSIS — Z98.890 OTHER SPECIFIED POSTPROCEDURAL STATES: Chronic | ICD-10-CM

## 2024-02-09 DIAGNOSIS — Z12.39 ENCOUNTER FOR OTHER SCREENING FOR MALIGNANT NEOPLASM OF BREAST: ICD-10-CM

## 2024-02-09 PROCEDURE — 76641 ULTRASOUND BREAST COMPLETE: CPT

## 2024-02-09 PROCEDURE — 77067 SCR MAMMO BI INCL CAD: CPT | Mod: 26

## 2024-02-09 PROCEDURE — 77063 BREAST TOMOSYNTHESIS BI: CPT

## 2024-02-09 PROCEDURE — 76641 ULTRASOUND BREAST COMPLETE: CPT | Mod: 26,50

## 2024-02-09 PROCEDURE — 77067 SCR MAMMO BI INCL CAD: CPT

## 2024-02-09 PROCEDURE — 77063 BREAST TOMOSYNTHESIS BI: CPT | Mod: 26

## 2024-03-14 ENCOUNTER — APPOINTMENT (OUTPATIENT)
Dept: OPHTHALMOLOGY | Facility: CLINIC | Age: 59
End: 2024-03-14
Payer: COMMERCIAL

## 2024-03-14 ENCOUNTER — NON-APPOINTMENT (OUTPATIENT)
Age: 59
End: 2024-03-14

## 2024-03-14 PROCEDURE — 92012 INTRM OPH EXAM EST PATIENT: CPT

## 2024-05-17 ENCOUNTER — NON-APPOINTMENT (OUTPATIENT)
Age: 59
End: 2024-05-17

## 2024-06-05 DIAGNOSIS — K86.2 CYST OF PANCREAS: ICD-10-CM

## 2024-06-19 ENCOUNTER — APPOINTMENT (OUTPATIENT)
Dept: UROLOGY | Facility: CLINIC | Age: 59
End: 2024-06-19

## 2024-06-21 ENCOUNTER — OUTPATIENT (OUTPATIENT)
Dept: OUTPATIENT SERVICES | Facility: HOSPITAL | Age: 59
LOS: 1 days | End: 2024-06-21
Payer: COMMERCIAL

## 2024-06-21 ENCOUNTER — APPOINTMENT (OUTPATIENT)
Dept: MRI IMAGING | Facility: CLINIC | Age: 59
End: 2024-06-21
Payer: COMMERCIAL

## 2024-06-21 DIAGNOSIS — K86.2 CYST OF PANCREAS: ICD-10-CM

## 2024-06-21 DIAGNOSIS — Z98.890 OTHER SPECIFIED POSTPROCEDURAL STATES: Chronic | ICD-10-CM

## 2024-06-21 PROCEDURE — 74183 MRI ABD W/O CNTR FLWD CNTR: CPT

## 2024-06-21 PROCEDURE — A9585: CPT

## 2024-06-21 PROCEDURE — 74183 MRI ABD W/O CNTR FLWD CNTR: CPT | Mod: 26

## 2024-08-15 ENCOUNTER — APPOINTMENT (OUTPATIENT)
Dept: CT IMAGING | Facility: CLINIC | Age: 59
End: 2024-08-15

## 2024-08-15 PROCEDURE — 70491 CT SOFT TISSUE NECK W/DYE: CPT | Mod: 26

## 2024-09-25 ENCOUNTER — APPOINTMENT (OUTPATIENT)
Dept: GASTROENTEROLOGY | Facility: CLINIC | Age: 59
End: 2024-09-25

## 2024-10-11 ENCOUNTER — APPOINTMENT (OUTPATIENT)
Dept: MRI IMAGING | Facility: CLINIC | Age: 59
End: 2024-10-11

## 2024-10-11 ENCOUNTER — OUTPATIENT (OUTPATIENT)
Dept: OUTPATIENT SERVICES | Facility: HOSPITAL | Age: 59
LOS: 1 days | End: 2024-10-11
Payer: COMMERCIAL

## 2024-10-11 DIAGNOSIS — Z98.890 OTHER SPECIFIED POSTPROCEDURAL STATES: Chronic | ICD-10-CM

## 2024-10-11 DIAGNOSIS — Z12.39 ENCOUNTER FOR OTHER SCREENING FOR MALIGNANT NEOPLASM OF BREAST: ICD-10-CM

## 2024-10-11 PROCEDURE — 77049 MRI BREAST C-+ W/CAD BI: CPT | Mod: 26

## 2024-10-11 PROCEDURE — A9585: CPT

## 2024-10-11 PROCEDURE — C8937: CPT

## 2024-10-11 PROCEDURE — C8908: CPT

## 2024-11-25 ENCOUNTER — APPOINTMENT (OUTPATIENT)
Dept: OPHTHALMOLOGY | Facility: CLINIC | Age: 59
End: 2024-11-25
Payer: COMMERCIAL

## 2024-11-25 ENCOUNTER — NON-APPOINTMENT (OUTPATIENT)
Age: 59
End: 2024-11-25

## 2024-11-25 PROCEDURE — 92014 COMPRE OPH EXAM EST PT 1/>: CPT

## 2024-11-25 PROCEDURE — 92133 CPTRZD OPH DX IMG PST SGM ON: CPT

## 2025-02-17 ENCOUNTER — APPOINTMENT (OUTPATIENT)
Dept: MAMMOGRAPHY | Facility: CLINIC | Age: 60
End: 2025-02-17
Payer: COMMERCIAL

## 2025-02-17 ENCOUNTER — RESULT REVIEW (OUTPATIENT)
Age: 60
End: 2025-02-17

## 2025-02-17 ENCOUNTER — APPOINTMENT (OUTPATIENT)
Dept: ULTRASOUND IMAGING | Facility: CLINIC | Age: 60
End: 2025-02-17
Payer: COMMERCIAL

## 2025-02-17 ENCOUNTER — OUTPATIENT (OUTPATIENT)
Dept: OUTPATIENT SERVICES | Facility: HOSPITAL | Age: 60
LOS: 1 days | End: 2025-02-17
Payer: COMMERCIAL

## 2025-02-17 DIAGNOSIS — Z98.890 OTHER SPECIFIED POSTPROCEDURAL STATES: Chronic | ICD-10-CM

## 2025-02-17 DIAGNOSIS — Z12.39 ENCOUNTER FOR OTHER SCREENING FOR MALIGNANT NEOPLASM OF BREAST: ICD-10-CM

## 2025-02-17 DIAGNOSIS — Z00.8 ENCOUNTER FOR OTHER GENERAL EXAMINATION: ICD-10-CM

## 2025-02-17 PROCEDURE — 77063 BREAST TOMOSYNTHESIS BI: CPT | Mod: 26

## 2025-02-17 PROCEDURE — 76641 ULTRASOUND BREAST COMPLETE: CPT | Mod: 26,50

## 2025-02-17 PROCEDURE — 77063 BREAST TOMOSYNTHESIS BI: CPT

## 2025-02-17 PROCEDURE — 77067 SCR MAMMO BI INCL CAD: CPT

## 2025-02-17 PROCEDURE — 77067 SCR MAMMO BI INCL CAD: CPT | Mod: 26

## 2025-02-17 PROCEDURE — 76641 ULTRASOUND BREAST COMPLETE: CPT

## 2025-08-15 ENCOUNTER — OUTPATIENT (OUTPATIENT)
Dept: OUTPATIENT SERVICES | Facility: HOSPITAL | Age: 60
LOS: 1 days | End: 2025-08-15
Payer: COMMERCIAL

## 2025-08-15 ENCOUNTER — APPOINTMENT (OUTPATIENT)
Dept: MRI IMAGING | Facility: CLINIC | Age: 60
End: 2025-08-15

## 2025-08-15 DIAGNOSIS — Z98.890 OTHER SPECIFIED POSTPROCEDURAL STATES: Chronic | ICD-10-CM

## 2025-08-15 DIAGNOSIS — K86.2 CYST OF PANCREAS: ICD-10-CM

## 2025-08-15 DIAGNOSIS — Z00.8 ENCOUNTER FOR OTHER GENERAL EXAMINATION: ICD-10-CM

## 2025-08-15 PROCEDURE — A9585: CPT

## 2025-08-15 PROCEDURE — 74183 MRI ABD W/O CNTR FLWD CNTR: CPT | Mod: 26

## 2025-08-15 PROCEDURE — 74183 MRI ABD W/O CNTR FLWD CNTR: CPT
